# Patient Record
Sex: MALE | Race: OTHER | Employment: OTHER | URBAN - METROPOLITAN AREA
[De-identification: names, ages, dates, MRNs, and addresses within clinical notes are randomized per-mention and may not be internally consistent; named-entity substitution may affect disease eponyms.]

---

## 2017-05-17 ENCOUNTER — HOSPITAL ENCOUNTER (OUTPATIENT)
Dept: PREADMISSION TESTING | Age: 77
Discharge: HOME | End: 2017-05-17
Payer: MEDICARE

## 2017-05-17 DIAGNOSIS — Z01.818 PRE-OP TESTING: ICD-10-CM

## 2017-05-17 DIAGNOSIS — M19.011 ARTHRITIS OF RIGHT SHOULDER REGION: ICD-10-CM

## 2017-05-17 LAB
ANION GAP BLD CALC-SCNC: 6 MMOL/L (ref 3–18)
BUN SERPL-MCNC: 26 MG/DL (ref 7–18)
BUN/CREAT SERPL: 18 (ref 12–20)
CALCIUM SERPL-MCNC: 8.4 MG/DL (ref 8.5–10.1)
CHLORIDE SERPL-SCNC: 109 MMOL/L (ref 100–108)
CO2 SERPL-SCNC: 25 MMOL/L (ref 21–32)
CREAT SERPL-MCNC: 1.47 MG/DL (ref 0.6–1.3)
ERYTHROCYTE [DISTWIDTH] IN BLOOD BY AUTOMATED COUNT: 13.4 % (ref 11.6–14.5)
GLUCOSE SERPL-MCNC: 106 MG/DL (ref 74–99)
HCT VFR BLD AUTO: 37.7 % (ref 36–48)
HGB BLD-MCNC: 12.5 G/DL (ref 13–16)
MCH RBC QN AUTO: 32.1 PG (ref 24–34)
MCHC RBC AUTO-ENTMCNC: 33.2 G/DL (ref 31–37)
MCV RBC AUTO: 96.9 FL (ref 74–97)
PLATELET # BLD AUTO: 200 K/UL (ref 135–420)
PMV BLD AUTO: 10.8 FL (ref 9.2–11.8)
POTASSIUM SERPL-SCNC: 4.7 MMOL/L (ref 3.5–5.5)
RBC # BLD AUTO: 3.89 M/UL (ref 4.7–5.5)
SODIUM SERPL-SCNC: 140 MMOL/L (ref 136–145)
WBC # BLD AUTO: 5.1 K/UL (ref 4.6–13.2)

## 2017-05-17 PROCEDURE — 36415 COLL VENOUS BLD VENIPUNCTURE: CPT | Performed by: ORTHOPAEDIC SURGERY

## 2017-05-17 PROCEDURE — 85027 COMPLETE CBC AUTOMATED: CPT | Performed by: ORTHOPAEDIC SURGERY

## 2017-05-17 PROCEDURE — 80048 BASIC METABOLIC PNL TOTAL CA: CPT | Performed by: ORTHOPAEDIC SURGERY

## 2017-05-17 RX ORDER — GUAIFENESIN 100 MG/5ML
81 LIQUID (ML) ORAL DAILY
COMMUNITY

## 2017-05-17 RX ORDER — IBUPROFEN 200 MG
400 TABLET ORAL
COMMUNITY

## 2017-05-17 RX ORDER — TERAZOSIN 2 MG/1
2 CAPSULE ORAL
COMMUNITY

## 2017-05-17 RX ORDER — ATORVASTATIN CALCIUM 40 MG/1
40 TABLET, FILM COATED ORAL DAILY
COMMUNITY

## 2017-05-17 RX ORDER — ASPIRIN 325 MG
1 TABLET, DELAYED RELEASE (ENTERIC COATED) ORAL
COMMUNITY

## 2017-05-17 RX ORDER — LISINOPRIL 5 MG/1
2.5 TABLET ORAL DAILY
COMMUNITY

## 2017-05-17 RX ORDER — LEVOTHYROXINE SODIUM 75 UG/1
TABLET ORAL
COMMUNITY
End: 2020-08-13 | Stop reason: DRUGHIGH

## 2017-06-01 ENCOUNTER — ANESTHESIA EVENT (OUTPATIENT)
Dept: SURGERY | Age: 77
DRG: 483 | End: 2017-06-01
Payer: MEDICARE

## 2017-06-02 ENCOUNTER — APPOINTMENT (OUTPATIENT)
Dept: GENERAL RADIOLOGY | Age: 77
DRG: 483 | End: 2017-06-02
Attending: ORTHOPAEDIC SURGERY
Payer: MEDICARE

## 2017-06-02 ENCOUNTER — ANESTHESIA (OUTPATIENT)
Dept: SURGERY | Age: 77
DRG: 483 | End: 2017-06-02
Payer: MEDICARE

## 2017-06-02 ENCOUNTER — HOSPITAL ENCOUNTER (INPATIENT)
Age: 77
LOS: 1 days | Discharge: HOME | DRG: 483 | End: 2017-06-03
Attending: ORTHOPAEDIC SURGERY | Admitting: ORTHOPAEDIC SURGERY
Payer: MEDICARE

## 2017-06-02 PROBLEM — M19.011 OSTEOARTHRITIS OF RIGHT SHOULDER REGION: Status: ACTIVE | Noted: 2017-06-02

## 2017-06-02 LAB
GLUCOSE BLD STRIP.AUTO-MCNC: 124 MG/DL (ref 70–110)
HCT VFR BLD AUTO: 34 % (ref 36–48)
HGB BLD-MCNC: 11.2 G/DL (ref 13–16)

## 2017-06-02 PROCEDURE — 77030020788: Performed by: ORTHOPAEDIC SURGERY

## 2017-06-02 PROCEDURE — 74011250637 HC RX REV CODE- 250/637: Performed by: ORTHOPAEDIC SURGERY

## 2017-06-02 PROCEDURE — 77030006822 HC BLD SAW SAG BRSM -B: Performed by: ORTHOPAEDIC SURGERY

## 2017-06-02 PROCEDURE — 77030008477 HC STYL SATN SLP COVD -A: Performed by: ANESTHESIOLOGY

## 2017-06-02 PROCEDURE — 77030018846 HC SOL IRR STRL H20 ICUM -A: Performed by: ORTHOPAEDIC SURGERY

## 2017-06-02 PROCEDURE — 77030011640 HC PAD GRND REM COVD -A: Performed by: ORTHOPAEDIC SURGERY

## 2017-06-02 PROCEDURE — 77030008683 HC TU ET CUF COVD -A: Performed by: ANESTHESIOLOGY

## 2017-06-02 PROCEDURE — 97162 PT EVAL MOD COMPLEX 30 MIN: CPT

## 2017-06-02 PROCEDURE — 0RRJ00Z REPLACEMENT OF RIGHT SHOULDER JOINT WITH REVERSE BALL AND SOCKET SYNTHETIC SUBSTITUTE, OPEN APPROACH: ICD-10-PCS | Performed by: ORTHOPAEDIC SURGERY

## 2017-06-02 PROCEDURE — 74011250636 HC RX REV CODE- 250/636: Performed by: ORTHOPAEDIC SURGERY

## 2017-06-02 PROCEDURE — 88305 TISSUE EXAM BY PATHOLOGIST: CPT | Performed by: ORTHOPAEDIC SURGERY

## 2017-06-02 PROCEDURE — A4565 SLINGS: HCPCS | Performed by: ORTHOPAEDIC SURGERY

## 2017-06-02 PROCEDURE — 74011000250 HC RX REV CODE- 250

## 2017-06-02 PROCEDURE — 74011250636 HC RX REV CODE- 250/636: Performed by: NURSE ANESTHETIST, CERTIFIED REGISTERED

## 2017-06-02 PROCEDURE — 85018 HEMOGLOBIN: CPT | Performed by: ORTHOPAEDIC SURGERY

## 2017-06-02 PROCEDURE — 77030002933 HC SUT MCRYL J&J -A: Performed by: ORTHOPAEDIC SURGERY

## 2017-06-02 PROCEDURE — 97530 THERAPEUTIC ACTIVITIES: CPT

## 2017-06-02 PROCEDURE — 82962 GLUCOSE BLOOD TEST: CPT

## 2017-06-02 PROCEDURE — 36415 COLL VENOUS BLD VENIPUNCTURE: CPT | Performed by: ORTHOPAEDIC SURGERY

## 2017-06-02 PROCEDURE — 74011250637 HC RX REV CODE- 250/637: Performed by: NURSE ANESTHETIST, CERTIFIED REGISTERED

## 2017-06-02 PROCEDURE — 74011250636 HC RX REV CODE- 250/636

## 2017-06-02 PROCEDURE — 77030013079 HC BLNKT BAIR HGGR 3M -A: Performed by: ANESTHESIOLOGY

## 2017-06-02 PROCEDURE — 88311 DECALCIFY TISSUE: CPT | Performed by: ORTHOPAEDIC SURGERY

## 2017-06-02 PROCEDURE — 76060000034 HC ANESTHESIA 1.5 TO 2 HR: Performed by: ORTHOPAEDIC SURGERY

## 2017-06-02 PROCEDURE — 65270000029 HC RM PRIVATE

## 2017-06-02 PROCEDURE — 73030 X-RAY EXAM OF SHOULDER: CPT

## 2017-06-02 PROCEDURE — 76010000153 HC OR TIME 1.5 TO 2 HR: Performed by: ORTHOPAEDIC SURGERY

## 2017-06-02 PROCEDURE — 77030031139 HC SUT VCRL2 J&J -A: Performed by: ORTHOPAEDIC SURGERY

## 2017-06-02 PROCEDURE — 74011250637 HC RX REV CODE- 250/637: Performed by: NURSE PRACTITIONER

## 2017-06-02 PROCEDURE — 74011000250 HC RX REV CODE- 250: Performed by: ORTHOPAEDIC SURGERY

## 2017-06-02 PROCEDURE — C1776 JOINT DEVICE (IMPLANTABLE): HCPCS | Performed by: ORTHOPAEDIC SURGERY

## 2017-06-02 PROCEDURE — L3650 SO 8 ABD RESTRAINT PRE OTS: HCPCS | Performed by: ORTHOPAEDIC SURGERY

## 2017-06-02 PROCEDURE — 88304 TISSUE EXAM BY PATHOLOGIST: CPT | Performed by: ORTHOPAEDIC SURGERY

## 2017-06-02 PROCEDURE — 76210000006 HC OR PH I REC 0.5 TO 1 HR: Performed by: ORTHOPAEDIC SURGERY

## 2017-06-02 PROCEDURE — 77030034075 HC SYR ASPIR ACDA INCL EXAC -G: Performed by: ORTHOPAEDIC SURGERY

## 2017-06-02 PROCEDURE — 77030034479 HC ADH SKN CLSR PRINEO J&J -B: Performed by: ORTHOPAEDIC SURGERY

## 2017-06-02 PROCEDURE — 77030032490 HC SLV COMPR SCD KNE COVD -B: Performed by: ORTHOPAEDIC SURGERY

## 2017-06-02 PROCEDURE — 77030003029 HC SUT VCRL J&J -B: Performed by: ORTHOPAEDIC SURGERY

## 2017-06-02 PROCEDURE — 77030018836 HC SOL IRR NACL ICUM -A: Performed by: ORTHOPAEDIC SURGERY

## 2017-06-02 PROCEDURE — 77030034444: Performed by: ORTHOPAEDIC SURGERY

## 2017-06-02 PROCEDURE — 77030011265 HC ELECTRD BLD HEX COVD -A: Performed by: ORTHOPAEDIC SURGERY

## 2017-06-02 PROCEDURE — 77030003806 HC BIT DRL EXAC -E: Performed by: ORTHOPAEDIC SURGERY

## 2017-06-02 DEVICE — TRAY HUM ADPT REV +0 -- EQUINOXE: Type: IMPLANTABLE DEVICE | Site: SHOULDER | Status: FUNCTIONAL

## 2017-06-02 DEVICE — KIT BNE SCR L34MM DIA4.5MM GLEN SHLDR RED COMPR LOK CAP REV: Type: IMPLANTABLE DEVICE | Site: SHOULDER | Status: FUNCTIONAL

## 2017-06-02 DEVICE — SCR BNE LCK GLENOSPHERE -- EQUINOXE: Type: IMPLANTABLE DEVICE | Site: SHOULDER | Status: FUNCTIONAL

## 2017-06-02 DEVICE — STEM HUM DIA13MM SHLDR PRI PRESSFIT EQUINOXE: Type: IMPLANTABLE DEVICE | Site: SHOULDER | Status: FUNCTIONAL

## 2017-06-02 DEVICE — SHOULDER REV IMPL -- S4 BSV SC: Type: IMPLANTABLE DEVICE | Site: SHOULDER | Status: FUNCTIONAL

## 2017-06-02 DEVICE — SPHERE GLEN DIA42MM REG STD SHLDR CO CHROM LCK SCR PRI RVS: Type: IMPLANTABLE DEVICE | Site: SHOULDER | Status: FUNCTIONAL

## 2017-06-02 DEVICE — IMPLANTABLE DEVICE: Type: IMPLANTABLE DEVICE | Site: SHOULDER | Status: FUNCTIONAL

## 2017-06-02 DEVICE — KIT BNE SCR L22MM DIA4.5MM GLEN SHLDR BLK COMPR LOK CAP REV: Type: IMPLANTABLE DEVICE | Site: SHOULDER | Status: FUNCTIONAL

## 2017-06-02 DEVICE — KIT BONE SCR L38MM DIA4.5MM GLEN SHLDR GRN COMPR LCK CAP RVS: Type: IMPLANTABLE DEVICE | Site: SHOULDER | Status: FUNCTIONAL

## 2017-06-02 DEVICE — SCR TORQUE DEFINING KIT -- EQUINOXE: Type: IMPLANTABLE DEVICE | Site: SHOULDER | Status: FUNCTIONAL

## 2017-06-02 RX ORDER — SODIUM CHLORIDE, SODIUM LACTATE, POTASSIUM CHLORIDE, CALCIUM CHLORIDE 600; 310; 30; 20 MG/100ML; MG/100ML; MG/100ML; MG/100ML
75 INJECTION, SOLUTION INTRAVENOUS CONTINUOUS
Status: DISCONTINUED | OUTPATIENT
Start: 2017-06-02 | End: 2017-06-02 | Stop reason: HOSPADM

## 2017-06-02 RX ORDER — HYDROCODONE BITARTRATE AND ACETAMINOPHEN 5; 325 MG/1; MG/1
1 TABLET ORAL ONCE
Status: COMPLETED | OUTPATIENT
Start: 2017-06-02 | End: 2017-06-02

## 2017-06-02 RX ORDER — SODIUM CHLORIDE 0.9 % (FLUSH) 0.9 %
5-10 SYRINGE (ML) INJECTION EVERY 8 HOURS
Status: DISCONTINUED | OUTPATIENT
Start: 2017-06-02 | End: 2017-06-03 | Stop reason: HOSPADM

## 2017-06-02 RX ORDER — PROPOFOL 10 MG/ML
INJECTION, EMULSION INTRAVENOUS AS NEEDED
Status: DISCONTINUED | OUTPATIENT
Start: 2017-06-02 | End: 2017-06-02 | Stop reason: HOSPADM

## 2017-06-02 RX ORDER — DIPHENHYDRAMINE HCL 25 MG
25 CAPSULE ORAL
Status: DISCONTINUED | OUTPATIENT
Start: 2017-06-02 | End: 2017-06-03 | Stop reason: HOSPADM

## 2017-06-02 RX ORDER — CEFAZOLIN SODIUM 2 G/50ML
2 SOLUTION INTRAVENOUS EVERY 8 HOURS
Status: COMPLETED | OUTPATIENT
Start: 2017-06-02 | End: 2017-06-03

## 2017-06-02 RX ORDER — NALOXONE HYDROCHLORIDE 0.4 MG/ML
0.4 INJECTION, SOLUTION INTRAMUSCULAR; INTRAVENOUS; SUBCUTANEOUS AS NEEDED
Status: DISCONTINUED | OUTPATIENT
Start: 2017-06-02 | End: 2017-06-03 | Stop reason: HOSPADM

## 2017-06-02 RX ORDER — ONDANSETRON 8 MG/1
4 TABLET, ORALLY DISINTEGRATING ORAL
Status: DISCONTINUED | OUTPATIENT
Start: 2017-06-02 | End: 2017-06-03 | Stop reason: HOSPADM

## 2017-06-02 RX ORDER — FENTANYL CITRATE 50 UG/ML
INJECTION, SOLUTION INTRAMUSCULAR; INTRAVENOUS AS NEEDED
Status: DISCONTINUED | OUTPATIENT
Start: 2017-06-02 | End: 2017-06-02 | Stop reason: HOSPADM

## 2017-06-02 RX ORDER — LIDOCAINE HYDROCHLORIDE 10 MG/ML
0.1 INJECTION, SOLUTION EPIDURAL; INFILTRATION; INTRACAUDAL; PERINEURAL AS NEEDED
Status: DISCONTINUED | OUTPATIENT
Start: 2017-06-02 | End: 2017-06-02 | Stop reason: HOSPADM

## 2017-06-02 RX ORDER — OXYCODONE AND ACETAMINOPHEN 5; 325 MG/1; MG/1
1-2 TABLET ORAL
Qty: 50 TAB | Refills: 0 | Status: SHIPPED | OUTPATIENT
Start: 2017-06-02 | End: 2020-02-21

## 2017-06-02 RX ORDER — ONDANSETRON 2 MG/ML
INJECTION INTRAMUSCULAR; INTRAVENOUS AS NEEDED
Status: DISCONTINUED | OUTPATIENT
Start: 2017-06-02 | End: 2017-06-02 | Stop reason: HOSPADM

## 2017-06-02 RX ORDER — HYDROMORPHONE HYDROCHLORIDE 1 MG/ML
0.5 INJECTION, SOLUTION INTRAMUSCULAR; INTRAVENOUS; SUBCUTANEOUS
Status: DISPENSED | OUTPATIENT
Start: 2017-06-02 | End: 2017-06-03

## 2017-06-02 RX ORDER — OXYCODONE AND ACETAMINOPHEN 5; 325 MG/1; MG/1
2 TABLET ORAL
Status: DISCONTINUED | OUTPATIENT
Start: 2017-06-02 | End: 2017-06-03 | Stop reason: HOSPADM

## 2017-06-02 RX ORDER — DIPHENHYDRAMINE HYDROCHLORIDE 50 MG/ML
25 INJECTION, SOLUTION INTRAMUSCULAR; INTRAVENOUS
Status: DISCONTINUED | OUTPATIENT
Start: 2017-06-02 | End: 2017-06-02 | Stop reason: HOSPADM

## 2017-06-02 RX ORDER — TERAZOSIN 2 MG/1
2 CAPSULE ORAL
Status: DISCONTINUED | OUTPATIENT
Start: 2017-06-02 | End: 2017-06-03 | Stop reason: HOSPADM

## 2017-06-02 RX ORDER — ROCURONIUM BROMIDE 10 MG/ML
INJECTION, SOLUTION INTRAVENOUS AS NEEDED
Status: DISCONTINUED | OUTPATIENT
Start: 2017-06-02 | End: 2017-06-02 | Stop reason: HOSPADM

## 2017-06-02 RX ORDER — ONDANSETRON 4 MG/1
4 TABLET, ORALLY DISINTEGRATING ORAL
Qty: 20 TAB | Refills: 0 | Status: SHIPPED | OUTPATIENT
Start: 2017-06-02 | End: 2020-02-21

## 2017-06-02 RX ORDER — HYDROMORPHONE HYDROCHLORIDE 2 MG/ML
0.5 INJECTION, SOLUTION INTRAMUSCULAR; INTRAVENOUS; SUBCUTANEOUS
Status: DISCONTINUED | OUTPATIENT
Start: 2017-06-02 | End: 2017-06-02

## 2017-06-02 RX ORDER — DIPHENHYDRAMINE HYDROCHLORIDE 50 MG/ML
25 INJECTION, SOLUTION INTRAMUSCULAR; INTRAVENOUS
Status: DISCONTINUED | OUTPATIENT
Start: 2017-06-02 | End: 2017-06-02

## 2017-06-02 RX ORDER — LISINOPRIL 5 MG/1
5 TABLET ORAL DAILY
Status: DISCONTINUED | OUTPATIENT
Start: 2017-06-03 | End: 2017-06-03 | Stop reason: HOSPADM

## 2017-06-02 RX ORDER — FENTANYL CITRATE 50 UG/ML
50 INJECTION, SOLUTION INTRAMUSCULAR; INTRAVENOUS
Status: DISCONTINUED | OUTPATIENT
Start: 2017-06-02 | End: 2017-06-02 | Stop reason: HOSPADM

## 2017-06-02 RX ORDER — ATORVASTATIN CALCIUM 40 MG/1
40 TABLET, FILM COATED ORAL DAILY
Status: DISCONTINUED | OUTPATIENT
Start: 2017-06-03 | End: 2017-06-03 | Stop reason: HOSPADM

## 2017-06-02 RX ORDER — FENTANYL CITRATE 50 UG/ML
50 INJECTION, SOLUTION INTRAMUSCULAR; INTRAVENOUS AS NEEDED
Status: DISCONTINUED | OUTPATIENT
Start: 2017-06-02 | End: 2017-06-02

## 2017-06-02 RX ORDER — GLYCOPYRROLATE 0.2 MG/ML
INJECTION INTRAMUSCULAR; INTRAVENOUS AS NEEDED
Status: DISCONTINUED | OUTPATIENT
Start: 2017-06-02 | End: 2017-06-02 | Stop reason: HOSPADM

## 2017-06-02 RX ORDER — ASPIRIN 325 MG
325 TABLET, DELAYED RELEASE (ENTERIC COATED) ORAL 2 TIMES DAILY
Status: DISCONTINUED | OUTPATIENT
Start: 2017-06-02 | End: 2017-06-03 | Stop reason: HOSPADM

## 2017-06-02 RX ORDER — CEFAZOLIN SODIUM 2 G/50ML
2 SOLUTION INTRAVENOUS
Status: COMPLETED | OUTPATIENT
Start: 2017-06-02 | End: 2017-06-02

## 2017-06-02 RX ORDER — HYDROMORPHONE HYDROCHLORIDE 2 MG/ML
0.5 INJECTION, SOLUTION INTRAMUSCULAR; INTRAVENOUS; SUBCUTANEOUS
Status: DISCONTINUED | OUTPATIENT
Start: 2017-06-02 | End: 2017-06-02 | Stop reason: HOSPADM

## 2017-06-02 RX ORDER — DOCUSATE SODIUM 100 MG/1
100 CAPSULE, LIQUID FILLED ORAL 2 TIMES DAILY
Status: DISCONTINUED | OUTPATIENT
Start: 2017-06-02 | End: 2017-06-03 | Stop reason: HOSPADM

## 2017-06-02 RX ORDER — OXYCODONE AND ACETAMINOPHEN 5; 325 MG/1; MG/1
1 TABLET ORAL
Status: DISCONTINUED | OUTPATIENT
Start: 2017-06-02 | End: 2017-06-03 | Stop reason: HOSPADM

## 2017-06-02 RX ORDER — VASOPRESSIN 20 U/ML
INJECTION PARENTERAL AS NEEDED
Status: DISCONTINUED | OUTPATIENT
Start: 2017-06-02 | End: 2017-06-02 | Stop reason: HOSPADM

## 2017-06-02 RX ORDER — SODIUM CHLORIDE 0.9 % (FLUSH) 0.9 %
5-10 SYRINGE (ML) INJECTION AS NEEDED
Status: DISCONTINUED | OUTPATIENT
Start: 2017-06-02 | End: 2017-06-03 | Stop reason: HOSPADM

## 2017-06-02 RX ORDER — LIDOCAINE HYDROCHLORIDE 20 MG/ML
INJECTION, SOLUTION EPIDURAL; INFILTRATION; INTRACAUDAL; PERINEURAL AS NEEDED
Status: DISCONTINUED | OUTPATIENT
Start: 2017-06-02 | End: 2017-06-02 | Stop reason: HOSPADM

## 2017-06-02 RX ORDER — NEOSTIGMINE METHYLSULFATE 5 MG/5 ML
SYRINGE (ML) INTRAVENOUS AS NEEDED
Status: DISCONTINUED | OUTPATIENT
Start: 2017-06-02 | End: 2017-06-02 | Stop reason: HOSPADM

## 2017-06-02 RX ORDER — BUPIVACAINE HYDROCHLORIDE AND EPINEPHRINE 2.5; 5 MG/ML; UG/ML
INJECTION, SOLUTION EPIDURAL; INFILTRATION; INTRACAUDAL; PERINEURAL AS NEEDED
Status: DISCONTINUED | OUTPATIENT
Start: 2017-06-02 | End: 2017-06-02 | Stop reason: HOSPADM

## 2017-06-02 RX ORDER — LEVOTHYROXINE SODIUM 75 UG/1
75 TABLET ORAL
Status: DISCONTINUED | OUTPATIENT
Start: 2017-06-03 | End: 2017-06-03 | Stop reason: HOSPADM

## 2017-06-02 RX ORDER — DIPHENHYDRAMINE HYDROCHLORIDE 50 MG/ML
25 INJECTION, SOLUTION INTRAMUSCULAR; INTRAVENOUS
Status: DISCONTINUED | OUTPATIENT
Start: 2017-06-02 | End: 2017-06-03 | Stop reason: HOSPADM

## 2017-06-02 RX ADMIN — Medication 10 ML: at 21:57

## 2017-06-02 RX ADMIN — DOCUSATE SODIUM 100 MG: 100 CAPSULE, LIQUID FILLED ORAL at 17:35

## 2017-06-02 RX ADMIN — ONDANSETRON 4 MG: 2 INJECTION INTRAMUSCULAR; INTRAVENOUS at 09:04

## 2017-06-02 RX ADMIN — GLYCOPYRROLATE 0.4 MG: 0.2 INJECTION INTRAMUSCULAR; INTRAVENOUS at 09:04

## 2017-06-02 RX ADMIN — SODIUM CHLORIDE, SODIUM LACTATE, POTASSIUM CHLORIDE, AND CALCIUM CHLORIDE: 600; 310; 30; 20 INJECTION, SOLUTION INTRAVENOUS at 09:22

## 2017-06-02 RX ADMIN — ROCURONIUM BROMIDE 50 MG: 10 INJECTION, SOLUTION INTRAVENOUS at 07:45

## 2017-06-02 RX ADMIN — OXYCODONE HYDROCHLORIDE AND ACETAMINOPHEN 2 TABLET: 5; 325 TABLET ORAL at 16:31

## 2017-06-02 RX ADMIN — CEFAZOLIN SODIUM 2 G: 2 SOLUTION INTRAVENOUS at 14:00

## 2017-06-02 RX ADMIN — FENTANYL CITRATE 100 MCG: 50 INJECTION, SOLUTION INTRAMUSCULAR; INTRAVENOUS at 07:40

## 2017-06-02 RX ADMIN — VASOPRESSIN 2 UNITS: 20 INJECTION PARENTERAL at 08:11

## 2017-06-02 RX ADMIN — VASOPRESSIN 1 UNITS: 20 INJECTION PARENTERAL at 08:51

## 2017-06-02 RX ADMIN — DOCUSATE SODIUM 100 MG: 100 CAPSULE, LIQUID FILLED ORAL at 12:47

## 2017-06-02 RX ADMIN — ASPIRIN 325 MG: 325 TABLET, DELAYED RELEASE ORAL at 17:35

## 2017-06-02 RX ADMIN — LIDOCAINE HYDROCHLORIDE 100 MG: 20 INJECTION, SOLUTION EPIDURAL; INFILTRATION; INTRACAUDAL; PERINEURAL at 07:45

## 2017-06-02 RX ADMIN — HYDROMORPHONE HYDROCHLORIDE 0.5 MG: 1 INJECTION, SOLUTION INTRAMUSCULAR; INTRAVENOUS; SUBCUTANEOUS at 11:46

## 2017-06-02 RX ADMIN — Medication 3 MG: at 09:04

## 2017-06-02 RX ADMIN — FENTANYL CITRATE 50 MCG: 50 INJECTION, SOLUTION INTRAMUSCULAR; INTRAVENOUS at 09:11

## 2017-06-02 RX ADMIN — OXYCODONE HYDROCHLORIDE AND ACETAMINOPHEN 2 TABLET: 5; 325 TABLET ORAL at 12:47

## 2017-06-02 RX ADMIN — CEFAZOLIN SODIUM 2 G: 2 SOLUTION INTRAVENOUS at 08:04

## 2017-06-02 RX ADMIN — PROPOFOL 150 MG: 10 INJECTION, EMULSION INTRAVENOUS at 07:45

## 2017-06-02 RX ADMIN — SODIUM CHLORIDE, SODIUM LACTATE, POTASSIUM CHLORIDE, AND CALCIUM CHLORIDE 75 ML/HR: 600; 310; 30; 20 INJECTION, SOLUTION INTRAVENOUS at 07:33

## 2017-06-02 RX ADMIN — FENTANYL CITRATE 50 MCG: 50 INJECTION, SOLUTION INTRAMUSCULAR; INTRAVENOUS at 09:31

## 2017-06-02 RX ADMIN — OXYCODONE HYDROCHLORIDE AND ACETAMINOPHEN 1 TABLET: 5; 325 TABLET ORAL at 22:23

## 2017-06-02 RX ADMIN — Medication 10 ML: at 17:36

## 2017-06-02 RX ADMIN — VASOPRESSIN 1 UNITS: 20 INJECTION PARENTERAL at 09:09

## 2017-06-02 RX ADMIN — TERAZOSIN HYDROCHLORIDE 2 MG: 2 CAPSULE ORAL at 21:56

## 2017-06-02 RX ADMIN — ASPIRIN 325 MG: 325 TABLET, DELAYED RELEASE ORAL at 12:47

## 2017-06-02 RX ADMIN — CEFAZOLIN SODIUM 2 G: 2 SOLUTION INTRAVENOUS at 21:56

## 2017-06-02 RX ADMIN — HYDROCODONE BITARTRATE AND ACETAMINOPHEN 1 TABLET: 5; 325 TABLET ORAL at 10:20

## 2017-06-02 NOTE — OP NOTES
Armando Gomez    Name:  Briseida Antonio  MR#:  [id number]  :  1940  Account #:  [de-identified]  Date of Adm:  2017  Date of Surgery:  2017      ATTENDING SURGEON: Antonio Claire MD    ASSISTANT: Staff. PREOPERATIVE DIAGNOSIS: Right shoulder rotator cuff arthropathy. POSTOPERATIVE DIAGNOSIS: Right shoulder rotator cuff  arthropathy. PROCEDURES PERFORMED: Right reverse total shoulder  arthroplasty. SPECIMENS REMOVED: None. ESTIMATED BLOOD LOSS: 125 mL. HARDWARE USED: Equinoxe 13 mm primary press-fit stem, 42 mm  glenosphere, +0 mm humeral adapter tray, a 42 mm +2.5 mm humeral  liner. ANESTHESIA: General.    INDICATIONS: The patient is a 21-year-old gentleman with  longstanding right shoulder pain who presents for right reverse total  shoulder arthroplasty. Risks of surgery including infection, bleeding,  damage to nerves and vessels. No improvement of his symptoms,  need of revision operation was discussed. He desires to proceed. DESCRIPTION OF PROCEDURE: After informed consent, the patient  was taken to the operating room, placed on the operating table. After  adequate sedation, he was intubated. He was positioned in beach  chair position with all bony prominences well-padded. Right upper  extremity was prepped and draped in sterile fashion using ChloraPrep. Appropriate timeout was taken. Deltopectoral approach to the shoulder  was performed, carried down through skin and subcutaneous tissues  down to the level of the deltopectoral interval, care protecting the  cephalic vein and retracting it laterally with the deltoid. The underlying  clavipectoral fascia was incised along the lateral border of the  coracobrachialis, exposing the proximal shoulder. The superior fibers  of the subscapularis were ruptured. There were several inferior fibers  remaining. A 0 Vicryl was placed into it and it was released and tagged  for possible later repair. An inferior capsular release was performed. The entire supraspinatus and infraspinatus tendons were ruptured. There was found to be severe humeral chondral loss. A humeral head  cut was made along its anatomic axis in 20 degrees of retroversion. Once this was performed, the humeral shaft was then reamed to a size  13 mm, broached to a 13 mm and a 13 mm press-fit stem was placed. Exposure of the glenoid was then performed by placing anterior and  posterior glenoid retractors. All paralabral tissue was removed, fully  exposing the glenoid. A glenoid guide was then applied to the surface  of the glenoid and a guide pin was placed into the center position of  the glenoid, exiting at Matsen's point. Once this was confirmed, the  glenoid was reamed to a size 42 and a central drill was used to drill the  hole for the glenoid peg. The glenoid baseplate was inserted and  secured with 3 peripheral locking screws, all inferior to the border of  the peg. The locking caps were placed as well. A 42 mm glenosphere  was then inserted and secured with a locking screw. Attention was turned back to the humerus. The humerus was trialed  with a 42 mm +2.5 and a +0 mm humeral liner. It was felt to have  better tissue tension and stability with a 2.5 mm. Trial components  were removed. The final components were inserted after breaking off  the torque defining screw. He was able to get his hand to the  contralateral shoulder fully over his head and had excellent overall  stability. The shoulder was then copiously irrigated with Betadine and  saline solution. Hemostasis was obtained with electrocautery. Platelet-  poor plasma was used as well to help with hemostasis. Platelet-rich  plasma was also used to help with augment wound healing. Then, 0  Vicryl for the deltopectoral interval, 2-0 Vicryl for the subcutaneous  tissues, 4-0 Monocryl subcuticular closure, and Prineo was used for  the skin.  He was placed in a sling and returned to the PACU in stable  condition. There were no complications.         Leonard Merrill MD CM / Caecilia.Crigler  D:  06/02/2017   09:35  T:  06/02/2017   10:18  Job #:  [id number]

## 2017-06-02 NOTE — ANESTHESIA POSTPROCEDURE EVALUATION
Post-Anesthesia Evaluation and Assessment    Patient: Vikram Hong. MRN: [id number]  SSN: [id number]    YOB: 1940  Age: 77 y.o. Sex: male       Cardiovascular Function/Vital Signs  Visit Vitals    /61    Pulse (!) 50    Temp 36.5 °C (97.7 °F)    Resp 12    Ht 5' 7\" (1.702 m)    Wt 94.6 kg (208 lb 9 oz)    SpO2 97%    BMI 32.67 kg/m2       Patient is status post general anesthesia for Procedure(s):  right release total SHOULDER  ARTHROPLASTY. Nausea/Vomiting: None    Postoperative hydration reviewed and adequate. Pain:  Pain Scale 1: Numeric (0 - 10) (06/02/17 1012)  Pain Intensity 1: 5 (06/02/17 1012)   Managed    Neurological Status:   Neuro (WDL): Within Defined Limits (06/02/17 1012)  Neuro  RUE Motor Response: Purposeful;Spontaneous  (06/02/17 1012)   At baseline    Mental Status and Level of Consciousness: Arousable    Pulmonary Status:   O2 Device: Room air (06/02/17 1012)   Adequate oxygenation and airway patent    Complications related to anesthesia: None    Post-anesthesia assessment completed.  No concerns    Signed By: Gisela Perkins MD     June 2, 2017

## 2017-06-02 NOTE — PERIOP NOTES
TRANSFER - OUT REPORT:    Verbal report given to [facility] [place], RN(name) on Lucero Ashley.  being transferred to 2200(unit) for routine post - op       Report consisted of patients Situation, Background, Assessment and   Recommendations(SBAR). Information from the following report(s) SBAR, Kardex, OR Summary, Intake/Output, MAR and Recent Results was reviewed with the receiving nurse. Lines:   Peripheral IV 06/02/17 Left Hand (Active)   Site Assessment Clean, dry, & intact 6/2/2017 10:12 AM   Phlebitis Assessment 0 6/2/2017 10:12 AM   Infiltration Assessment 0 6/2/2017 10:12 AM   Dressing Status Clean, dry, & intact 6/2/2017  9:35 AM   Dressing Type Transparent;Tape 6/2/2017 10:12 AM   Hub Color/Line Status Pink; Infusing 6/2/2017 10:12 AM   Action Taken Open ports on tubing capped 6/2/2017 10:12 AM   Alcohol Cap Used Yes 6/2/2017 10:12 AM        Opportunity for questions and clarification was provided.       Patient transported with:   [employer] pump

## 2017-06-02 NOTE — PERIOP NOTES
4439  Patient received in PACU and connected to monitors. Vital signs stable. RN at bedside. Will continue to monitor. [address] to pt's wife via phone re update and plan of care. Awaiting room assignment. Pt sitting up in stretcher, eating ice chips. No s/s distress. 1020  Pt medicated per MAR after eating saltines crackers. [address] re post op xray. Notified that \"all techs are in a room at the moment, pt is next on the list\". 65  Pt's wife escorted to PACU to see pt while waiting for xray. 1050  Radiology here to complete xray. Wife escorted back to waiting room, notified of room assignment. Called 2200, RN will call back for report.

## 2017-06-02 NOTE — BRIEF OP NOTE
BRIEF OPERATIVE NOTE    Date of Procedure: 6/2/2017   Preoperative Diagnosis: right shoulder oa m19.011  Postoperative Diagnosis: right shoulder oa m19.011    Procedure(s):  right release total SHOULDER  ARTHROPLASTY  Surgeon(s) and Role:     * Mari Poe MD - Primary         Assistant Staff:       Surgical Staff:  Circ-1: Loyd Tubbs RN  Scrub Tech-1: Rambo Nunes  Surg Asst-1: Krysten Robb  Event Time In   Incision Start 4990   Incision Close      Anesthesia: General   Estimated Blood Loss: 125 cc  Specimens: * No specimens in log *   Findings: see op note   Complications: none  Implants:   Implant Name Type Inv. Item Serial No.  Lot No. LRB No. Used Action   SCR LCK CAP KIT 4.5X38MM RED -- E[employer] - [id number]  SCR LCK CAP KIT 4.5X38MM RED -- Edd Josh [id number] [employer]  Right 1 Implanted   SCR LCK CAP KIT 4.5X34MM RED -- E[employer]INOXE - [id number]  SCR LCK CAP KIT 4.5X34MM RED -- [place] Josh [id number] [employer]  Right 1 Implanted   PLATE MATT HUM ADPT REV +5MM -- Edd Moreno - [id number]  PLATE MATT HUM ADPT REV +5MM -- Edd Moreno [id number] [employer]  Right 1 Implanted   SCR TORQUE DEFINING KIT -- E[employer]INOXE - [id number]  SCR TORQUE DEFINING KIT -- [place] Moreno [id number] [employer]  Right 1 Implanted   SCR LCK CAP KIT 4.5X22MM BLK -- EQUINOXE - [id number]  SCR LCK CAP KIT 4.5X22MM BLK -- E[employer]E [id number] [employer]  Right 1 Implanted   HEAD GLENOSPHERE REV 42MM -- E[employer]INOXE - [id number]  HEAD GLENOSPHERE REV 42MM -- [employer] [id number] [employer]  Right 1 Implanted   TRAY HUM ADPT REV +0 -- E[employer] - [id number]  TRAY HUM ADPT REV +0 -- Edd Moreno [id number] [employer]  Right 1 Implanted   STEM HUM PF PRIMARY 13. 0MM -- Edd Moreno - [id number]  STEM HUM PF PRIMARY 13. 0MM -- Edd Moreno [id number] [employer]  Right 1 Implanted   SCR BNE LCK GLENOSPHERE -- EQUINOXE - [id number]  SCR BNE LCK GLENOSPHERE -- [employer]E [id number] [employer]  Right 1 Implanted   LINER HUM REV 42MM +2.5 -- EQUINOXE - [id number]   LINER HUM REV 42MM +2.5 John Sanchez [id number] [employer]   Right 1 Implanted     Home tomorrow  Jacoby williamson

## 2017-06-02 NOTE — PROGRESS NOTES
Problem: Mobility Impaired (Adult and Pediatric)  Goal: *Acute Goals and Plan of Care (Insert Text)  PHYSICAL THERAPY SHORT TERM GOALS : no weight bearing with right shoulder /UE  1. Patient will perform/completebed mobility with modified independence within 1 week(s). 2. Patient will perform/completesupine to sitting with modified independence within 1 week(s). 3. Patient will perform/completesit to stand with modified independence within 1 week(s). 4. Patient will perform/completeambulate for 150 feet with least restrictive device with modified independence within 1 week(s). 5. Patient will perform /completeascend and descend 2 steps without railing modified independence within 1 week(s). 6. Patient will complete pendulum exercises for right shoulder independently 3 times per day. Therapist Triston Arizmendi, PT 6/2/2017  Time Calculation: 20 mins  Outcome: Progressing Towards Goal  PHYSICAL THERAPY EVALUATION     Patient: Jennifer Stoner (70 y.o. male)  Date: 6/2/2017  Primary Diagnosis: right shoulder oa m19.011  Osteoarthritis of right shoulder region  Procedure(s) (LRB):  right release total SHOULDER  ARTHROPLASTY (Right) Day of Surgery   Precautions:   Fall (no wieght on and no arom of right shoulder )      PROBLEM LIST:  Patient presents with the following problems:   Bed Mobility, Transfers, Range of Motion, Balance and Home Exercise Program  ASSESSMENT :   Patient requires between minimal assistance/contact guard assist and supervision/set-up for bed mobility, transfers and ambulation. patient instructed in pendulum exercise for right  Shoulder needing reinforcement, safety with  Gait and proper  Alignment of sling on right UE pain pre and post 5/10 pre and post  Patient also stood for 3 minutes to urinate needing wife to assist with holding urinal informed nursing. Patient will benefit from skilled intervention to address the above impairments.   Patients rehabilitation potential is considered to be Good  Factors which may influence rehabilitation potential include:   [ ]         None noted  [ ]         Mental ability/status  [X]         Medical condition  [ ]         Home/family situation and support systems  [ ]         Safety awareness  [ ]         Pain tolerance/management  [ ]         Other:        PLAN :  Recommendations and Planned Interventions:  [X]           Bed Mobility Training             [X]    Neuromuscular Re-Education  [X]           Transfer Training                   [ ]    Orthotic/Prosthetic Training  [X]           Gait Training                          [ ]    Modalities  [X]           Therapeutic Exercises          [ ]    Edema Management/Control  [X]           Therapeutic Activities            [X]    Patient and Family Training/Education  [ ]           Other (comment):     Frequency/Duration: Patient will be followed by physical therapy 1-2 times per day/4-7 days per week to address goals. 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 The severity rating is based on the [employer] Balance Scale4/5   [facility] Standing Balance Scale4/5  0: Pt performs 25% or less of standing activity (Max assist) CN, 100% impaired. 1: Pt supports self with upper extremities but requires therapist assistance.  Pt performs 25-50% of effort (Mod assist) CM, 80% to <100% impaired. 1+: Pt supports self with upper extremities but requires therapist assistance. Pt performs >50% effort. (Min assist). CL, 60% to <80% impaired. 2: Pt supports self independently with both upper extremities (walker, crutches, parallel bars). CL, 60% to <80% impaired. 2+: Pt support self independently with 1 upper extremity (cane, crutch, 1 parallel bar). CK, 40% to <60% impaired. 3: Pt stands without upper extremity support for up to 30 seconds. CK, 40% to <60% impaired. 3+: Pt stands without upper extremity support for 30 seconds or greater. CJ, 20% to <40% impaired. 4: Pt independently moves and returns center of gravity 1-2 inches in one plane. CJ, 20% to <40% impaired. 4+: Pt independently moves and returns center of gravity 1-2 inches in multiple planes. CI, 1% to <20% impaired. 5: Pt independently moves and returns center of gravity in all planes greater than 2 inches. CH, 0% impaired.      Eval Complexity: History: HIGH Complexity :3+ comorbidities / personal factors will impact the outcome/ POC Exam:MEDIUM Complexity : 3 Standardized tests and measures addressing body structure, function, activity limitation and / or participation in recreation  Presentation: MEDIUM Complexity : Evolving with changing characteristics  Clinical Decision Making:Medium Complexity [facility] Standing Balance Scale4/5 Overall Complexity:MEDIUM     Prior Level of Function/Home Situation: I with ADL's and ambulation without assistive device   Home Situation  Home Environment: Private residence  # Steps to Enter: 3  Rails to Enter: Yes  Hand Rails : Bilateral  One/Two Story Residence: One story  Living Alone: No  Support Systems: Friends \ neighbors, Family member(s)  Patient Expects to be Discharged to[de-identified] Private residence  Current DME Used/Available at Home: None  Critical Behavior:  Neurologic State: Alert  Orientation Level: Oriented X4;Appropriate for age  Cognition: Follows commands; Appropriate decision making  Safety/Judgement: Awareness of environment; Fall prevention  Psychosocial  Patient Behaviors: Calm; Cooperative  Family  Behaviors: Calm; Appropriate for situation; Cooperative  Purposeful Interaction: Yes  Pt Identified Daily Priority: Clinical issues (comment)  Caritas Process: Nurture loving kindness;Enable jorge/hope;Establish trust;Nurture spiritual self;Teaching/learning; Attend basic human needs;Create healing environment  Caring Interventions: Reassure; Therapeutic modalities  Reassure: Therapeutic listening; Informing; Acceptance  Therapeutic Modalities: Deep breathing;Humor; Intentional therapeutic touch  Skin Condition/Temp: Dry;Warm  Family  Behaviors: Calm; Appropriate for situation; Cooperative  Skin Integrity: Incision (comment) (Rt shoulder)  Skin Integumentary  Skin Color: Appropriate for ethnicity  Skin Condition/Temp: Dry;Warm  Skin Integrity: Incision (comment) (Rt shoulder)  Turgor: Non-tenting  Hair Growth: Present  Varicosities: Absent  Strength:    Strength: Generally decreased, functional (3+/5 both legs )  Not tested right UE  Left 3+/5  Tone & Sensation:   Tone: Normal  Sensation: Intact     Range Of Motion:  AROM: Generally decreased, functional (no active on right Shoulder )  PROM: Generally decreased, functional  Functional Mobility:  Bed Mobility:  Rolling: Contact guard assistance;Stand-by asssistance  Supine to Sit: Stand-by asssistance;Contact guard assistance  Sit to Supine:  (left in chair )   Transfers:  Sit to Stand: Contact guard assistance;Stand-by asssistance  Stand to Sit: Stand-by asssistance;Contact guard assistance  Balance:   Sitting: Impaired  Sitting - Static: Good (unsupported); Fair (occasional)  Sitting - Dynamic: Fair (occasional)  Standing: Impaired  Standing - Static: Fair  Standing - Dynamic : Fair  Ambulation/Gait Training:  Distance (ft): 55 Feet (ft) (x2)  Assistive Device:  (none)  Ambulation - Level of Assistance: Stand-by asssistance;Supervision  Gait Description (WDL): Exceptions to WDL  Gait Abnormalities: Antalgic; Altered arm swing  Base of Support: Center of gravity altered  Speed/Josi: Fluctuations  Interventions: Safety awareness training  Therapeutic Exercises:   Pendulum for right shoulder elbow wrist and finger exercises on right UE   Pain:  Pre treatment pain level:5  Post treatment pain level:5  Pain Scale 1: Numeric (0 - 10)  Activity Tolerance:   Fair   Please refer to the flowsheet for vital signs taken during this treatment. After treatment:   [X]         Patient left in no apparent distress sitting up in chair  [ ]         Patient left in no apparent distress in bed  [X]         Call bell left within reach  [X]         Nursing notified  [X]         Caregiver present  [ ]         Bed alarm activated      COMMUNICATION/EDUCATION:   [X]         Fall prevention education was provided and the patient/caregiver indicated understanding. [X]         Patient/family have participated as able in goal setting and plan of care. [X]         Patient/family agree to work toward stated goals and plan of care. [ ]         Patient understands intent and goals of therapy, but is neutral about his/her participation. [ ]         Patient is unable to participate in goal setting and plan of care. Patient educated on the role of physical therapy during the acute stay  and the importance of mobility. VU.needs reinforcement.         Thank you for this referral.  Asael Jerez, PT   Time Calculation: 20 mins

## 2017-06-02 NOTE — IP AVS SNAPSHOT
Current Discharge Medication List  
  
START taking these medications Dose & Instructions Dispensing Information Comments Morning Noon Evening Bedtime  
 ondansetron 4 mg disintegrating tablet Commonly known as:  ZOFRAN ODT Your last dose was: Your next dose is:    
   
   
 Dose:  4 mg Take 1 Tab by mouth every eight (8) hours as needed for Nausea. Quantity:  20 Tab Refills:  0  
     
   
   
   
  
 oxyCODONE-acetaminophen 5-325 mg per tablet Commonly known as:  PERCOCET Your last dose was: Your next dose is:    
   
   
 Dose:  1-2 Tab Take 1-2 Tabs by mouth every four (4) hours as needed for Pain. Max Daily Amount: 12 Tabs. Quantity:  50 Tab Refills:  0 CONTINUE these medications which have NOT CHANGED Dose & Instructions Dispensing Information Comments Morning Noon Evening Bedtime ADVIL 200 mg tablet Generic drug:  ibuprofen Your last dose was: Your next dose is:    
   
   
 Dose:  400 mg Take 400 mg by mouth. Refills:  0  
     
   
   
   
  
 aspirin 81 mg chewable tablet Your last dose was: Your next dose is:    
   
   
 Dose:  81 mg Take 81 mg by mouth daily. Refills:  0  
     
   
   
   
  
 atorvastatin 40 mg tablet Commonly known as:  LIPITOR Your last dose was: Your next dose is:    
   
   
 Dose:  40 mg Take 40 mg by mouth daily. Refills:  0 Cholecalciferol (Vitamin D3) 50,000 unit Cap Your last dose was: Your next dose is:    
   
   
 Dose:  1 Cap Take 1 Cap by mouth every seven (7) days. Refills:  0  
     
   
   
   
  
 levothyroxine 75 mcg tablet Commonly known as:  SYNTHROID Your last dose was: Your next dose is: Take  by mouth Daily (before breakfast). Refills:  0  
     
   
   
   
  
 lisinopril 5 mg tablet Commonly known as:  [facility] [place] Daughters Your last dose was: Your next dose is:    
   
   
 Dose:  5 mg Take 5 mg by mouth daily. Refills:  0  
     
   
   
   
  
 terazosin 2 mg capsule Commonly known as:  HYTRIN Your last dose was: Your next dose is:    
   
   
 Dose:  2 mg Take 2 mg by mouth nightly. Refills:  0 Where to Get Your Medications Information on where to get these meds will be given to you by the nurse or doctor. ! Ask your nurse or doctor about these medications  
  ondansetron 4 mg disintegrating tablet  
 oxyCODONE-acetaminophen 5-325 mg per tablet

## 2017-06-02 NOTE — DISCHARGE INSTRUCTIONS
David LUDWIG  SHOULDER ARTHROPLASTY POSTOPERATIVE INSTRUCTIONS          1. Apply bag of ice to your shoulder (not directly on the skin) at least 20 minutes each 4 hour interval for the first 2-3 days or until the swelling resolves. Sitting/sleeping in a 30-45 degree reclined position (recliner chair or propped on pillows) often helps with swelling and comfort. While most of the swelling resolves within the first week, it is not uncommon to experince swelling for up to 6 weeks after your surgery. 2. A light dressing may me needed on your wound if there is drainage. If there is no drainage then leave your wound open to air. 3.  You may shower after 48 hiours. Do not let water run constantly over the incisions or submerse them under water in the bath,pool,or hot tub as this may lead to infection. 4.  Wear the sling most of the time. 5.  It is important to begin working on range of motion immediately after surgery. Please follow Dr. Aldo Scott instructions on the exercises  to do. 6.  The first post-operative office visit is apprximately 10 days after your surgery. Call [phone] to schedule this visit after you get home. 7.  At the [address] [place] [address] office visit, the following will be addressed:               A.  Suture removal and determine the need for physical therapy               B.  Review arthroscopy photos               C.  Work release/status and forms        8. Disability/FMLA forms are to be directed to Dr. Aldo Scott . Due to the consuming nature of this paperwork, a fee is charged to have them completed. 9.  Any other questions, concerns, or needs are handled by contacting  Dr. Aldo Scott  [phone] during office hours. If an emergency arises outside of office hours, please contact the office's answering service or your nearest Emergency Room.               1.    Pendulum, Circular       3 times daily in each direction. 2. Passive elevation  Use only your good arm to raise the operative arm  Sets of ten 3 times daily           DISCHARGE SUMMARY from Nurse    The following personal items are in your possession at time of discharge:    Dental Appliances: None  Visual Aid: None     Home Medications: None  Jewelry: None  Clothing: Undergarments, Shirt, Footwear, Jacket/Coat, Pants  Other Valuables: None             PATIENT INSTRUCTIONS:    After general anesthesia or intravenous sedation, for 24 hours or while taking prescription Narcotics:  · Limit your activities  · Do not drive and operate hazardous machinery  · Do not make important personal or business decisions  · Do  not drink alcoholic beverages  · If you have not urinated within 8 hours after discharge, please contact your surgeon on call. Report the following to your surgeon:  · Excessive pain, swelling, redness or odor of or around the surgical area  · Temperature over 100.5  · Nausea and vomiting lasting longer than 4 hours or if unable to take medications  · Any signs of decreased circulation or nerve impairment to extremity: change in color, persistent  numbness, tingling, coldness or increase pain  · Any questions        What to do at Home:  Recommended activity: Activity as tolerated and No driving while on analgesics. If you experience any of the following symptoms redness, Nausea and vomiting lasting 4 hours or more, pain unrelieved by pain medication, numbness and tingling, loss of sensation, please follow up with MD Evelin Marks. *  Please give a list of your current medications to your Primary Care Provider. *  Please update this list whenever your medications are discontinued, doses are      changed, or new medications (including over-the-counter products) are added. *  Please carry medication information at all times in case of emergency situations.           These are general instructions for a healthy lifestyle:    No smoking/ No tobacco products/ Avoid exposure to second hand smoke    Surgeon General's Warning:  Quitting smoking now greatly reduces serious risk to your health. Obesity, smoking, and sedentary lifestyle greatly increases your risk for illness    A healthy diet, regular physical exercise & weight monitoring are important for maintaining a healthy lifestyle    You may be retaining fluid if you have a history of heart failure or if you experience any of the following symptoms:  Weight gain of 3 pounds or more overnight or 5 pounds in a week, increased swelling in our hands or feet or shortness of breath while lying flat in bed. Please call your doctor as soon as you notice any of these symptoms; do not wait until your next office visit. Recognize signs and symptoms of STROKE:    F-face looks uneven    A-arms unable to move or move unevenly    S-speech slurred or non-existent    T-time-call 911 as soon as signs and symptoms begin-DO NOT go       Back to bed or wait to see if you get better-TIME IS BRAIN. Warning Signs of HEART ATTACK     Call 911 if you have these symptoms:   Chest discomfort. Most heart attacks involve discomfort in the center of the chest that lasts more than a few minutes, or that goes away and comes back. It can feel like uncomfortable pressure, squeezing, fullness, or pain.  Discomfort in other areas of the upper body. Symptoms can include pain or discomfort in one or both arms, the back, neck, jaw, or stomach.  Shortness of breath with or without chest discomfort.  Other signs may include breaking out in a cold sweat, nausea, or lightheadedness. Don't wait more than five minutes to call 911 - MINUTES MATTER! Fast action can save your life. Calling 911 is almost always the fastest way to get lifesaving treatment. Emergency Medical Services staff can begin treatment when they arrive -- up to an hour sooner than if someone gets to the hospital by car.        The discharge information has been reviewed with the patient and spouse. The patient and spouse verbalized understanding. Discharge medications reviewed with the patient and spouse and appropriate educational materials and side effects teaching were provided. Patient armband removed and shreddedDischarge medications reviewed with the patient and spouse and appropriate educational materials and side effects teaching were provided.

## 2017-06-02 NOTE — CONSULTS
2 [facility]  Hospitalist Division    Consult Note    Patient: Alyse Amaral MRN: [id number]  CSN: [id number]    YOB: 1940  Age: 77 y.o. Sex: male    DOA: 6/2/2017 LOS:  LOS: 0 days        Requesting Physician:  Dr. Haydee Romero   Reason for Consultation:  Medical Management    Chief Complaint:  OA of right shoulder     Assessment/Plan     Patient Active Problem List   Diagnosis Code    Osteoarthritis of right shoulder region M19.011       A/P:  S/p Right total shoulder arthroplasty:  HTN: Hytrin, Lisinopril. Monitor BP control   CAD: ASA  Hypercholesterolemia: Lipitor   Hypothyroidism: Synthroid   Prediabetes: diet managed   DVT prophylaxis: SCDs       HPI:     Alyse Amaral is a 77 y.o. male with a hx of HTN, CAD, prediabetes, hypercholesterolemia, diverticulitis, tongue cancer and osteoarthritis who underwent right total shoulder arthroplasty by Dr. Haydee Romero. Patient reports he was a previous smoker of 1 PPD for 20 years who quit in 1983. He denies history of MI or CVA. Hospitalist Team was consulted for ongoing medical management. 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 No pertinent family history.     Social History     Social History    Marital status:      Spouse name: N/A    Number of children: N/A    Years of education: N/A     Social History Main Topics    Smoking status: Former Smoker     Quit date: 5/17/1983    Smokeless tobacco: None    Alcohol use Yes      Comment: rarely    Drug use: No    Sexual activity: Not Asked     Other Topics Concern    None     Social History Narrative       Prior to Admission medications    Medication Sig Start Date End Date Taking? Authorizing Provider   oxyCODONE-acetaminophen (PERCOCET) 5-325 mg per tablet Take 1-2 Tabs by mouth every four (4) hours as needed for Pain. Max Daily Amount: 12 Tabs. 6/2/17  Yes Raisa West MD   ondansetron (ZOFRAN ODT) 4 mg disintegrating tablet Take 1 Tab by mouth every eight (8) hours as needed for Nausea. 6/2/17  Yes Raisa West MD   aspirin 81 mg chewable tablet Take 81 mg by mouth daily. Yes Historical Provider   levothyroxine (SYNTHROID) 75 mcg tablet Take  by mouth Daily (before breakfast). Yes Historical Provider   lisinopril (PRINIVIL, ZESTRIL) 5 mg tablet Take 5 mg by mouth daily. Yes Historical Provider   terazosin (HYTRIN) 2 mg capsule Take 2 mg by mouth nightly. Yes Historical Provider   Cholecalciferol, Vitamin D3, 50,000 unit cap Take 1 Cap by mouth every seven (7) days. Yes Historical Provider   atorvastatin (LIPITOR) 40 mg tablet Take 40 mg by mouth daily. Historical Provider   ibuprofen (ADVIL) 200 mg tablet Take 400 mg by mouth.     Historical Provider       Allergies   Allergen Reactions    Codeine Nausea and Vomiting       Review of Systems  - fever, - chills, - fatigue, - weight loss, - night sweats   - sore throat, - sinus congestion, - lymphadenopathy, - vision changes  - CP, -  palpitations  - dyspnea on exertion, - dyspnea at rest, - cough, - hemoptysis  - nausea, - vomiting, - diarrhea, - abdominal pain, - reflux, - dysphagia  - dysuria, - hematuria, - urinary frequency  - rash, - pruritis  - back pain, - neck pain, - myalgia, + arthralgia  - H/A, - numbness, - tingling, + weakness, - slurred speech    Physical Exam:      Visit Vitals    /58 (BP 1 Location: Left arm, BP Patient Position: At rest)    Pulse (!) 49    Temp 97.4 °F (36.3 °C)    Resp 13    Ht 5' 7\" (1.702 m)    Wt 94.6 kg (208 lb 9 oz)    SpO2 98%    BMI 32.67 kg/m2       Physical Exam:  Gen: In general, this is a well nourished  male in no acute distress on room air.  HEENT: Sclerae nonicteric. Oral mucous membranes moist.    Neck: Supple with midline trachea. CV: RRR without murmur or rub appreciated. Resp:Respirations are unlabored without use of accessory muscles. Lung fields bilaterally without wheezes or rhonchi. Abd: Soft, nontender, nondistended. Normoactive bowel sounds. Extrem: Extremities are warm, without cyanosis or clubbing. No pitting pretibial edema. Palpable distal pulses X 4.   Skin: Warm, no visible rashes. Neuro: Patient is alert, oriented, and cooperative. No obvious focal defects. Moves all 4 extremities.     Labs Reviewed:    Recent Results (from the past 24 hour(s))   GLUCOSE, POC    Collection Time: 06/02/17  7:32 AM   Result Value Ref Range    Glucose (POC) 124 (H) 70 - 110 mg/dL   HGB & HCT    Collection Time: 06/02/17 11:58 AM   Result Value Ref Range    HGB 11.2 (L) 13.0 - 16.0 g/dL    HCT 34.0 (L) 36.0 - 48.0 %               GARRET Robles-SASHA [address]  Pager:  [phone]  Office:  [phone]

## 2017-06-02 NOTE — IP AVS SNAPSHOT
[address] Patient: Kiran Bynum. MRN: [id number] JOV:[birth year] You are allergic to the following Allergen Reactions Codeine Nausea and Vomiting Recent Documentation Height Weight BMI Smoking Status 1.702 m 94.6 kg 32.67 kg/m2 Former Smoker Emergency Contacts Name Discharge Info Relation Home Work Mobile Melinda Ely DISCHARGE CAREGIVER [3] Spouse [3] [phone] About your hospitalization You were admitted on:  June 2, 2017 You last received care in the:  [address] [place] [address],[address] You were discharged on:  Ayla 3, 2017 Unit phone number:  [phone] Why you were hospitalized Your primary diagnosis was:  Status Post Total Replacement Of Right Shoulder Your diagnoses also included:  Osteoarthritis Of Right Shoulder Region, Hypercholesteremia, Thyroid Disease, Diabetes Mellitus Type 2, Diet-Controlled (Hcc), Hypertension, Cad (Coronary Artery Disease) Providers Seen During Your Hospitalizations Provider Role Specialty Primary office phone Laroy Lundborg, MD Attending Provider Orthopedic Surgery [phone] Your Primary Care Physician (PCP) Primary Care Physician Office Phone Office Fax [place]  [phone] Follow-up Information Follow up With Details Comments Contact Info Oracio Alejandro MD   [address] [facility] [address] [facility] [id number] [phone] Laroy Lundborg, MD In 2 weeks call to schedulefollow up appointment  [facility] [address] [facility] [address] [phone] Current Discharge Medication List  
  
START taking these medications Dose & Instructions Dispensing Information Comments Morning Noon Evening Bedtime  
 ondansetron 4 mg disintegrating tablet Commonly known as:  ZOFRAN ODT  
   
 Your last dose was: Your next dose is:    
   
   
 Dose:  4 mg Take 1 Tab by mouth every eight (8) hours as needed for Nausea. Quantity:  20 Tab Refills:  0  
     
   
   
   
  
 oxyCODONE-acetaminophen 5-325 mg per tablet Commonly known as:  PERCOCET Your last dose was: Your next dose is:    
   
   
 Dose:  1-2 Tab Take 1-2 Tabs by mouth every four (4) hours as needed for Pain. Max Daily Amount: 12 Tabs. Quantity:  50 Tab Refills:  0 CONTINUE these medications which have NOT CHANGED Dose & Instructions Dispensing Information Comments Morning Noon Evening Bedtime ADVIL 200 mg tablet Generic drug:  ibuprofen Your last dose was: Your next dose is:    
   
   
 Dose:  400 mg Take 400 mg by mouth. Refills:  0  
     
   
   
   
  
 aspirin 81 mg chewable tablet Your last dose was: Your next dose is:    
   
   
 Dose:  81 mg Take 81 mg by mouth daily. Refills:  0  
     
   
   
   
  
 atorvastatin 40 mg tablet Commonly known as:  LIPITOR Your last dose was: Your next dose is:    
   
   
 Dose:  40 mg Take 40 mg by mouth daily. Refills:  0 Cholecalciferol (Vitamin D3) 50,000 unit Cap Your last dose was: Your next dose is:    
   
   
 Dose:  1 Cap Take 1 Cap by mouth every seven (7) days. Refills:  0  
     
   
   
   
  
 levothyroxine 75 mcg tablet Commonly known as:  SYNTHROID Your last dose was: Your next dose is: Take  by mouth Daily (before breakfast). Refills:  0  
     
   
   
   
  
 lisinopril 5 mg tablet Commonly known as:  Alecia Manuel Your last dose was: Your next dose is:    
   
   
 Dose:  5 mg Take 5 mg by mouth daily. Refills:  0  
     
   
   
   
  
 terazosin 2 mg capsule Commonly known as:  HYTRIN Your last dose was: Your next dose is:    
   
   
 Dose:  2 mg Take 2 mg by mouth nightly. Refills:  0 Where to Get Your Medications Information on where to get these meds will be given to you by the nurse or doctor. ! Ask your nurse or doctor about these medications  
  ondansetron 4 mg disintegrating tablet  
 oxyCODONE-acetaminophen 5-325 mg per tablet Discharge Instructions [place] DR. Darrick Duane SHOULDER ARTHROPLASTY POSTOPERATIVE INSTRUCTIONS 1. Apply bag of ice to your shoulder (not directly on the skin) at least 20 minutes each 4 hour interval for the first 2-3 days or until the swelling resolves. Sitting/sleeping in a 30-45 degree reclined position (recliner chair or propped on pillows) often helps with swelling and comfort. While most of the swelling resolves within the first week, it is not uncommon to experince swelling for up to 6 weeks after your surgery. 2. A light dressing may me needed on your wound if there is drainage. If there is no drainage then leave your wound open to air. 3.  You may shower after 48 hiours. Do not let water run constantly over the incisions or submerse them under water in the bath,pool,or hot tub as this may lead to infection. 4.  Wear the sling most of the time. 5.  It is important to begin working on range of motion immediately after surgery. Please follow Dr. Renan Nice instructions on the exercises  to do. 6.  The first post-operative office visit is apprximately 10 days after your surgery. Call [phone] to schedule this visit after you get home. 7.  At the [address] [place] [address] office visit, the following will be addressed: A.  Suture removal and determine the need for physical therapy B.  Review arthroscopy photos C.  Work release/status and forms 8.  Disability/FMLA forms are to be directed to Dr. Makenna Ac. Due to the consuming nature of this paperwork, a fee is charged to have them completed. 9.  Any other questions, concerns, or needs are handled by contacting  Dr. Arabella Ceballos  [phone] during office hours. If an emergency arises outside of office hours, please contact the office's answering service or your nearest Emergency Room. 1.    Pendulum, Circular 3 times daily in each direction. 2. Passive elevation Use only your good arm to raise the operative arm Sets of ten 3 times daily DISCHARGE SUMMARY from Nurse The following personal items are in your possession at time of discharge: 
 
Dental Appliances: None Visual Aid: None Home Medications: None Jewelry: None Clothing: Undergarments, Shirt, Footwear, Jacket/Coat, Pants Other Valuables: None PATIENT INSTRUCTIONS: 
 
After general anesthesia or intravenous sedation, for 24 hours or while taking prescription Narcotics: · Limit your activities · Do not drive and operate hazardous machinery · Do not make important personal or business decisions · Do  not drink alcoholic beverages · If you have not urinated within 8 hours after discharge, please contact your surgeon on call. Report the following to your surgeon: 
· Excessive pain, swelling, redness or odor of or around the surgical area · Temperature over 100.5 · Nausea and vomiting lasting longer than 4 hours or if unable to take medications · Any signs of decreased circulation or nerve impairment to extremity: change in color, persistent  numbness, tingling, coldness or increase pain · Any questions What to do at Home: 
Recommended activity: Activity as tolerated and No driving while on analgesics.  
 
If you experience any of the following symptoms redness, Nausea and vomiting lasting 4 hours or more, pain unrelieved by pain medication, numbness and tingling, loss of sensation, please follow up with MD Evelin Marks. *  Please give a list of your current medications to your Primary Care Provider. *  Please update this list whenever your medications are discontinued, doses are 
    changed, or new medications (including over-the-counter products) are added. *  Please carry medication information at all times in case of emergency situations. These are general instructions for a healthy lifestyle: No smoking/ No tobacco products/ Avoid exposure to second hand smoke Surgeon General's Warning:  Quitting smoking now greatly reduces serious risk to your health. Obesity, smoking, and sedentary lifestyle greatly increases your risk for illness A healthy diet, regular physical exercise & weight monitoring are important for maintaining a healthy lifestyle You may be retaining fluid if you have a history of heart failure or if you experience any of the following symptoms:  Weight gain of 3 pounds or more overnight or 5 pounds in a week, increased swelling in our hands or feet or shortness of breath while lying flat in bed. Please call your doctor as soon as you notice any of these symptoms; do not wait until your next office visit. Recognize signs and symptoms of STROKE: 
 
F-face looks uneven A-arms unable to move or move unevenly S-speech slurred or non-existent T-time-call 911 as soon as signs and symptoms begin-DO NOT go Back to bed or wait to see if you get better-TIME IS BRAIN. Warning Signs of HEART ATTACK Call 911 if you have these symptoms: 
? Chest discomfort. Most heart attacks involve discomfort in the center of the chest that lasts more than a few minutes, or that goes away and comes back. It can feel like uncomfortable pressure, squeezing, fullness, or pain. ? Discomfort in other areas of the upper body.  Symptoms can include pain or discomfort in one or both arms, the back, neck, jaw, or stomach. ? Shortness of breath with or without chest discomfort. ? Other signs may include breaking out in a cold sweat, nausea, or lightheadedness. Don't wait more than five minutes to call [address]! Fast action can save your life. Calling 911 is almost always the fastest way to get lifesaving treatment. Emergency Medical Services staff can begin treatment when they arrive  up to an hour sooner than if someone gets to the hospital by car. The discharge information has been reviewed with the patient and spouse. The patient and spouse verbalized understanding. Discharge medications reviewed with the patient and spouse and appropriate educational materials and side effects teaching were provided. Patient armband removed and shreddedDischarge medications reviewed with the patient and spouse and appropriate educational materials and side effects teaching were provided. Discharge Orders None [employer] [facility] Announcement We are excited to announce that we are making your provider's discharge notes available to you in [employer] [facility]. You will see these notes when they are completed and signed by the physician that discharged you from your recent hospital stay. If you have any questions or concerns about any information you see in [employer] [facility], please call the Health Information Department where you were seen or reach out to your Primary Care Provider for more information about your plan of care. Introducing [facility]! Dalila Jung introduces [employer] patient portal. Now you can access parts of your medical record, email your doctor's office, and request medication refills online. 1. In your internet browser, go to [email or web address] 2. Click on the First Time User? Click Here link in the Sign In box. You will see the New Member Sign Up page. 3. Enter your [employer] Access Code exactly as it appears below. You will not need to use this code after youve completed the sign-up process. If you do not sign up before the expiration date, you must request a new code. · [employer] Access Code: [email or web address] Expires: 2017 12:31 PM 
 
4. Enter the last four digits of your Social Security Number (xxxx) and Date of Birth (mm/dd/yyyy) as indicated and click Submit. You will be taken to the next sign-up page. 5. Create a [employer] ID. This will be your [employer] login ID and cannot be changed, so think of one that is secure and easy to remember. 6. Create a [employer] password. You can change your password at any time. 7. Enter your Password Reset Question and Answer. This can be used at a later time if you forget your password. 8. Enter your e-mail address. You will receive e-mail notification when new information is available in [address] 9. Click Sign Up. You can now view and download portions of your medical record. 10. Click the Download Summary menu link to download a portable copy of your medical information. If you have questions, please visit the Frequently Asked Questions section of the [employer] website. Remember, [employer] is NOT to be used for urgent needs. For medical emergencies, dial 911. Now available from your iPhone and Android! General Information Please provide this summary of care documentation to your next provider. Patient Signature:  ____________________________________________________________ Date:  ____________________________________________________________  
  
Christiano Williamugh Provider Signature:  ____________________________________________________________ Date:  ____________________________________________________________

## 2017-06-02 NOTE — ROUTINE PROCESS
TRANSFER - IN REPORT:    Verbal report received from SHAZIA Miguel RN(name) on [place] Comment.  being received from [facility]) for routine progression of care      Report consisted of patients Situation, Background, Assessment and   Recommendations(SBAR). Information from the following report(s) SBAR, Kardex, Procedure Summary and MAR was reviewed with the receiving nurse. Opportunity for questions and clarification was provided. Assessment completed upon patients arrival to unit and care assumed. 1130- Pt arrives from PACU.    1146- Pt given IV pain med. 1247- Pt verbalizes minor relief from pain. Pt given PO pain med.    1359- Pt verbalizes some relief from pain at this time. 1631- Pt given PO pain med. 1733- Pt verbalizes some relief from pain at this time. 1912- Bedside and Verbal shift change report given to Loulou Amor RN (oncoming nurse) by HARMONY Kasper (offgoing nurse). Report included the following information SBAR, Kardex and MAR.

## 2017-06-02 NOTE — ANESTHESIA PREPROCEDURE EVALUATION
Anesthetic History   No history of anesthetic complications            Review of Systems / Medical History  Patient summary reviewed and pertinent labs reviewed    Pulmonary  Within defined limits                 Neuro/Psych   Within defined limits           Cardiovascular                  Exercise tolerance: >4 METS     GI/Hepatic/Renal  Within defined limits              Endo/Other    Diabetes: well controlled, type 2  Hypothyroidism: well controlled  Arthritis and cancer     Other Findings   Comments:   Risk Factors for Postoperative nausea/vomiting:       History of postoperative nausea/vomiting? NO       Female? NO       Motion sickness? NO       Intended opioid administration for postoperative analgesia? YES      Smoking Abstinence  Current Smoker? NO  Elective Surgery? YES  Seen preoperatively by anesthesiologist or proxy prior to day of surgery? YES  Pt abstained from smoking 24 hours prior to anesthesia?  N/A           Physical Exam    Airway  Mallampati: III  TM Distance: 4 - 6 cm  Neck ROM: decreased range of motion   Mouth opening: Normal     Cardiovascular    Rhythm: regular  Rate: normal         Dental    Dentition: Caps/crowns     Pulmonary  Breath sounds clear to auscultation               Abdominal  GI exam deferred       Other Findings            Anesthetic Plan    ASA: 3  Anesthesia type: general          Induction: Intravenous  Anesthetic plan and risks discussed with: Patient

## 2017-06-02 NOTE — PROGRESS NOTES
Pt resting in bed with eyes closed. Visitor at bedside. Discussed pain control and plan for PT. Verbalized understanding.

## 2017-06-03 VITALS
SYSTOLIC BLOOD PRESSURE: 119 MMHG | WEIGHT: 208.56 LBS | HEART RATE: 60 BPM | RESPIRATION RATE: 20 BRPM | HEIGHT: 67 IN | OXYGEN SATURATION: 92 % | TEMPERATURE: 98 F | DIASTOLIC BLOOD PRESSURE: 62 MMHG | BODY MASS INDEX: 32.73 KG/M2

## 2017-06-03 PROBLEM — E11.9 DIABETES MELLITUS TYPE 2, DIET-CONTROLLED (HCC): Status: ACTIVE | Noted: 2017-06-03

## 2017-06-03 PROBLEM — I10 HYPERTENSION: Status: ACTIVE | Noted: 2017-06-03

## 2017-06-03 PROBLEM — E07.9 THYROID DISEASE: Status: ACTIVE | Noted: 2017-06-03

## 2017-06-03 PROBLEM — I25.10 CAD (CORONARY ARTERY DISEASE): Status: ACTIVE | Noted: 2017-06-03

## 2017-06-03 PROBLEM — E78.00 HYPERCHOLESTEREMIA: Status: ACTIVE | Noted: 2017-06-03

## 2017-06-03 PROBLEM — Z96.611 STATUS POST TOTAL REPLACEMENT OF RIGHT SHOULDER: Status: ACTIVE | Noted: 2017-06-03

## 2017-06-03 PROCEDURE — 74011250637 HC RX REV CODE- 250/637: Performed by: ORTHOPAEDIC SURGERY

## 2017-06-03 PROCEDURE — 74011250636 HC RX REV CODE- 250/636: Performed by: ORTHOPAEDIC SURGERY

## 2017-06-03 PROCEDURE — 74011250637 HC RX REV CODE- 250/637: Performed by: NURSE PRACTITIONER

## 2017-06-03 PROCEDURE — 97110 THERAPEUTIC EXERCISES: CPT

## 2017-06-03 PROCEDURE — 77030027138 HC INCENT SPIROMETER -A

## 2017-06-03 RX ADMIN — OXYCODONE HYDROCHLORIDE AND ACETAMINOPHEN 1 TABLET: 5; 325 TABLET ORAL at 10:41

## 2017-06-03 RX ADMIN — LISINOPRIL 5 MG: 5 TABLET ORAL at 10:40

## 2017-06-03 RX ADMIN — CEFAZOLIN SODIUM 2 G: 2 SOLUTION INTRAVENOUS at 07:00

## 2017-06-03 RX ADMIN — Medication 10 ML: at 07:01

## 2017-06-03 RX ADMIN — LEVOTHYROXINE SODIUM 75 MCG: 75 TABLET ORAL at 07:49

## 2017-06-03 RX ADMIN — DOCUSATE SODIUM 100 MG: 100 CAPSULE, LIQUID FILLED ORAL at 10:40

## 2017-06-03 RX ADMIN — OXYCODONE HYDROCHLORIDE AND ACETAMINOPHEN 1 TABLET: 5; 325 TABLET ORAL at 03:47

## 2017-06-03 RX ADMIN — ATORVASTATIN CALCIUM 40 MG: 40 TABLET, FILM COATED ORAL at 10:40

## 2017-06-03 RX ADMIN — ASPIRIN 325 MG: 325 TABLET, DELAYED RELEASE ORAL at 10:40

## 2017-06-03 NOTE — DISCHARGE SUMMARY
Discharge Summary    Admit Date: 2017  Discharge Date:  6/3/2017     Patient ID:   Name:  Claudeen Lather. Age:  77 y.o.    :  1940    Admitting Diagnosis: right shoulder oa m19.011  Osteoarthritis of right shoulder region     Post Operative Day: 2    Operative Procedures:  RECONSTR TOTAL SHOULDER IMPLANT [42038] (SHOULDER ARTHROPLASTY/TOTAL)      Isolation Precautions:   Not required. Patient is not currently contagious. Physical Exam on Discharge:  Visit Vitals    /62 (BP 1 Location: Left arm)    Pulse 60    Temp 98 °F (36.7 °C)    Resp 20    Ht 5' 7\" (1.702 m)    Wt 94.6 kg (208 lb 9 oz)    SpO2 92%    BMI 32.67 kg/m2         General: in no apparent distress   Wound: clean, dry   Extremities:  Neurovascular intact    Dressing:  Dry   DVT Exam:   No evidence of DVT seen on physical exam;  compartments soft and NT. Relevant labs within last 72 hours:    CBC w/Diff    Lab Results   Component Value Date/Time    WBC 5.1 2017 09:15 AM    RBC 3.89 (L) 2017 09:15 AM    HCT 34.0 (L) 2017 11:58 AM    MCV 96.9 2017 09:15 AM    MCH 32.1 2017 09:15 AM    MCHC 33.2 2017 09:15 AM    RDW 13.4 2017 09:15 AM    Lab Results   Component Value Date/Time    RDW 13.4 2017 09:15 AM          BMP   Lab Results   Component Value Date     2017    CO2 25 2017    BUN 26 (H) 2017          Coagulation   No results found for: INR, APTT           Condition at discharge: Afebrile  Ambulating  Eating, Drinking, Voiding  Stable    Current Discharge Medication List      START taking these medications    Details   oxyCODONE-acetaminophen (PERCOCET) 5-325 mg per tablet Take 1-2 Tabs by mouth every four (4) hours as needed for Pain. Max Daily Amount: 12 Tabs. Qty: 50 Tab, Refills: 0      ondansetron (ZOFRAN ODT) 4 mg disintegrating tablet Take 1 Tab by mouth every eight (8) hours as needed for Nausea.   Qty: 20 Tab, Refills: 0 CONTINUE these medications which have NOT CHANGED    Details   aspirin 81 mg chewable tablet Take 81 mg by mouth daily. levothyroxine (SYNTHROID) 75 mcg tablet Take  by mouth Daily (before breakfast). lisinopril (PRINIVIL, ZESTRIL) 5 mg tablet Take 5 mg by mouth daily. terazosin (HYTRIN) 2 mg capsule Take 2 mg by mouth nightly. Cholecalciferol, Vitamin D3, 50,000 unit cap Take 1 Cap by mouth every seven (7) days. atorvastatin (LIPITOR) 40 mg tablet Take 40 mg by mouth daily. ibuprofen (ADVIL) 200 mg tablet Take 400 mg by mouth. PCP:  Josué Uriarte MD        Disposition:  Clear for discharge to home, if clear by medicine service. Follow-up in the office in 10 days with Dr. Ayaz Heredia; call [phone] to schedule appointment.      Wound Care: open to air       Ivana López PA-C (R)  6/3/2017  Pager [phone]  Office [phone]  Cell [phone]

## 2017-06-03 NOTE — PROGRESS NOTES
[facility]  Hospitalist Division        Inpatient Daily Progress Note    Daily progress Note    Patient: Liv Schwarz MRN: [id number]  CSN: [id number]    YOB: 1940  Age: 77 y.o. Sex: male    DOA: 6/2/2017 LOS:  LOS: 1 day                    Chief Complaint:  Right total shoulder arthroplasty       Subjective:      Working with PT. No events overnight. In NAD. Objective:      Visit Vitals    /62 (BP 1 Location: Left arm)    Pulse 60    Temp 98 °F (36.7 °C)    Resp 20    Ht 5' 7\" (1.702 m)    Wt 94.6 kg (208 lb 9 oz)    SpO2 92%    BMI 32.67 kg/m2           Physical Exam:  General appearance: alert, cooperative, no distress, appears stated age  Lungs: clear to auscultation bilaterally, no wheezes   Heart: regular rate and rhythm, S1, S2 normal, no murmur, click, rub or gallop  Abdomen: soft, non tender, non distended.  Normoactive bowel sounds  Extremities: Right shoulder in sling   Skin: surgical incision CDI   Neurologic: moves all 4 extremities   PSY: Mood and affect normal, appropriately behaved        Intake and Output:  Current Shift:  06/03 0701 - 06/03 1900  In: 480 [P.O.:480]  Out: -   Last three shifts:  06/01 1901 - 06/03 0700  In: 1930 [P.O.:480; I.V.:1450]  Out: 600 [Urine:600]    Recent Results (from the past 24 hour(s))   HGB & HCT    Collection Time: 06/02/17 11:58 AM   Result Value Ref Range    HGB 11.2 (L) 13.0 - 16.0 g/dL    HCT 34.0 (L) 36.0 - 48.0 %           Lab Results   Component Value Date/Time    Glucose 106 05/17/2017 09:15 AM        Assessment/Plan:     Patient Active Problem List   Diagnosis Code    Osteoarthritis of right shoulder region M19.011    Hypercholesteremia E78.00    Thyroid disease E07.9    Diabetes mellitus type 2, diet-controlled (HCC) E11.9    Hypertension I10    CAD (coronary artery disease) I25.10    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 Hytrin, Lisinopril.  Monitor BP control   CAD: ASA  Hypercholesterolemia: Lipitor   Hypothyroidism: Synthroid   Prediabetes: diet managed   DVT prophylaxis: SCDs   Medically stable for discharge home per Primary Team       MARIANA Joe [address]  Pager:  [phone]  Office:  [phone]

## 2017-06-03 NOTE — PROGRESS NOTES
1043 am meds given, patient made aware of discharge orders, requesting to wait until lunch tray arrives before leaving, no acute distress noted, assessment complete     1427 discharge instruction given, patient verbalized understanding, teach back utilized, scripts given as well as teaching. Patient made aware of next dose time for percocet. Patient request wife sign discharge instruction electronically. Teaching given in regards to range of motion exercises.  Wife present for teaching per patient's request

## 2017-06-03 NOTE — PROGRESS NOTES
Problem: Mobility Impaired (Adult and Pediatric)  Goal: *Acute Goals and Plan of Care (Insert Text)  PHYSICAL THERAPY SHORT TERM GOALS : no weight bearing with right shoulder /UE  1. Patient will perform/completebed mobility with modified independence within 1 week(s). 2. Patient will perform/completesupine to sitting with modified independence within 1 week(s). 3. Patient will perform/completesit to stand with modified independence within 1 week(s). 4. Patient will perform/completeambulate for 150 feet with least restrictive device with modified independence within 1 week(s). 5. Patient will perform /completeascend and descend 2 steps without railing modified independence within 1 week(s). 6. Patient will complete pendulum exercises for right shoulder independently 3 times per day. Therapist Linus Fitzgerald, PT 6/2/2017  Time Calculation: 20 mins   Outcome: Progressing Towards Goal  PHYSICAL THERAPY TREATMENT     Patient: Karyn Wells. (70 y.o. male)  Date: 6/3/2017  Diagnosis: right shoulder oa m19.011  Osteoarthritis of right shoulder region Status post total replacement of right shoulder  Procedure(s) (LRB):  right release total SHOULDER  ARTHROPLASTY (Right) 1 Day Post-Op  Precautions: Fall (no wieght on and no arom of right shoulder )   Chart, physical therapy assessment, plan of care and goals were reviewed. ASSESSMENT:  Pt sitting in chair upon entering room, reports ambulating up and down the halls with spouse. No difficulty with sit to stand from chair. Removed sling, reviewed Codman's exercise in all directions, pt demo proper technique. In chair reviewed finger, wrist, forearm and elbow exercises. Educated pt and spouse on proper fit of sling. Pt left sitting in chair. Education: Sling use and fit, dressing and washing tech, TE 2x/day, codman's at kitchen counter to protect back, no active movement of shoulder jt use other UE to gently move it.   Progression toward goals:  [X] Improving appropriately and progressing toward goals  [ ]      Improving slowly and progressing toward goals  [ ]      Not making progress toward goals and plan of care will be adjusted       PLAN:  Patient continues to benefit from skilled intervention to address the above impairments. Continue treatment per established plan of care. Discharge Recommendations:  Home Health and Outpatient  Further Equipment Recommendations for Discharge:  N/A       SUBJECTIVE:   Patient stated I have been walking all over the place.       OBJECTIVE DATA SUMMARY:   Critical Behavior:  Neurologic State: Alert  Orientation Level: Oriented X4  Cognition: Follows commands  Safety/Judgement: Awareness of environment, Fall prevention  Functional Mobility Training:  Transfers:  Sit to Stand: Independent  Stand to Sit: Independent  Balance:  Sitting: Intact  Sitting - Static: Good (unsupported)  Sitting - Dynamic: Good (unsupported)  Standing: Intact; Without support  Standing - Static: Good  Standing - Dynamic : Good  Therapeutic Exercises:   Codman's, finger flex/ext, wrist flex/ext/ulnar/rad deviation, forearm pro/sup, elbow flex/ext  Pain:  Pre 5  Post 5  Pain Scale 1: Numeric (0 - 10)     Pain Location 1: Shoulder  Pain Orientation 1: Right  Pain Description 1: Aching  Pain Intervention(s) 1: Medication (see MAR)  Activity Tolerance:   Good  Please refer to the flowsheet for vital signs taken during this treatment.   After treatment:   [X] Patient left in no apparent distress sitting up in chair  [ ] Patient left in no apparent distress in bed  [X] Call bell left within reach  [ ] Nursing notified  [X] Caregiver present  [ ] Bed alarm activated      Umair Clarke PTA   Time Calculation: 24 mins

## 2017-06-03 NOTE — PROGRESS NOTES
conducted an initial consultation and Spiritual Assessment for Karyn Templeton, who is a 77 y. o.,male. Patients Primary Language is: [place]. According to the patients EMR Latter day Affiliation is: [facility].     The reason the Patient came to the hospital is:   Patient Active Problem List    Diagnosis Date Noted    Hypercholesteremia 06/03/2017    Thyroid disease 06/03/2017    Diabetes mellitus type 2, diet-controlled ([facility] [address] 06/03/2017    Hypertension 06/03/2017    CAD (coronary artery disease) 06/03/2017    Status post total replacement of right shoulder 06/03/2017    Osteoarthritis of right shoulder region 06/02/2017        The  provided the following Interventions:  Initiated a relationship of care and support. Explored issues of jorge, belief, spirituality and Alevism/ritual needs while hospitalized. Listened empathically. Provided information about Spiritual Care Services. Offered prayer and assurance of continued prayers on patient's behalf. Chart reviewed. The following outcomes were achieved:  Patient shared limited information about both their medical narrative and spiritual journey/beliefs. Patient processed feeling about current hospitalization. Patient expressed gratitude for 's visit. Assessment:  Patient does not have any Alevism/cultural needs that will affect patients preferences in health care. There are no further spiritual or Alevism issues which require intervention at this time. Plan:  Chaplains will continue to follow and will provide pastoral care on an as needed/requested basis.  recommends bedside caregivers page  on duty if patient shows signs of acute spiritual or emotional distress. Foster Buchanan M.Div.   [facility] [address]  [phone]

## 2017-06-03 NOTE — PROGRESS NOTES
Progress Note      Post Operative Day: 1    Assessment:    1. Status post  RECONSTR TOTAL SHOULDER IMPLANT [16426] (SHOULDER ARTHROPLASTY/TOTAL) for right shoulder oa m19.011  Osteoarthritis of right shoulder region 6/2/2017,   Progressing. PLAN:    1. Mobilize. Continue P.T.  2.  Sling, teri demonstrated  3. Discharge Planning home today           HPI: Nile Montesinos is a 77 y.o. male patient without new complaints status post TSA for right shoulder oa m19.011  Osteoarthritis of right shoulder region 6/2/2017. No new orthopaedic changes. Blood pressure 119/62, pulse 60, temperature 98 °F (36.7 °C), resp. rate 20, height 5' 7\" (1.702 m), weight 94.6 kg (208 lb 9 oz), SpO2 92 %. CBC w/Diff   Lab Results   Component Value Date/Time    WBC 5.1 05/17/2017 09:15 AM    RBC 3.89 (L) 05/17/2017 09:15 AM    HCT 34.0 (L) 06/02/2017 11:58 AM          Physical Assessment:  General: in no apparent distress   Wound: clean, dry   Extremities:  Neurovascular intact    Dressing:  minimal SA drainage   DVT Exam:   No exam evidence to suggest DVT. Compartments soft and NT. Radiology: AP and transscapular projections of the right shoulder are obtained  with the patient in a sling. There is a reverse ball and socket right total  shoulder arthroplasty with components projecting in near-anatomic position. No  evidence of periprosthetic fracture. Included portion of the right lung  demonstrates no acute abnormality.  Ice pack noted over the mid humeral shaft         - Alexa Carballo PA-C  6/3/2017  Pager [phone]  Office [phone]  Cell [phone]

## 2017-06-04 NOTE — PROGRESS NOTES
[facility]   Discharge Planning/ Assessment    Reasons for Intervention: Pt jocelynn interviewed 06/03/17 a.m. This is late entry. He verified info, lives with wife, no HH or DME Plan is home.     High Risk Criteria  [] Yes  [x]No   Physician Referral  [] Yes  [x]No        Date    Nursing Referral  [] Yes  [x]No        Date    Patient/Family Request  [] Yes  [x]No        Date       Resources:    Medicare  [x] Yes  []No   Medicaid  [] Yes  [x]No   No Resources  [] Yes  [x]No   Private Insurance  [x] Yes  []No    Name/Phone Number    Other  [] Yes  [x]No        (i.e. Workman's Comp)         Prior Services:    Prior Services  [] Yes  [x]No   Home Health  [] Yes  [x]No   [address]  [] Yes  [x]No        [address] Program  [] Yes  [x]No       Meals on Wheels  [] Yes  [x]No   Office on Aging  [] Yes  [x]No   Transportation Services  [] Yes  [x]No   Nursing Home  [] Yes  [x]No        Nursing Home Name    [address]  [] Yes  [x]No        [address] Name    Other       Information Source:      Information obtained from  [x] Patient  [] Parent   [] [address]  [] Child  [] Spouse   [] Significant Other/Partner   [] Friend      [] EMS    [] Nursing Home Chart          [] Other:   Chart Review  [x] Yes  []No     Family/Support System:    Patient lives with  [] Alone    [x] Spouse   [] Significant Other  [] Children  [] Caretaker   [] Parent  [] Sibling     [] Other       Other Support System:    Is the patient responsible for care of others  [] Yes  [x]No   Information of person caring for patient on  discharge    Managers financial affairs independently  [x] Yes  []No   If no, explain:      Status Prior to Admission:    Mental Status  [x] Awake  [] Alert  [] Oriented  [] Quiet/Calm [] Lethargic/Sedated   [] Disoriented  [] Restless/Anxious  [] Combative   Personal Care  [] Dependent  [x] [address]  [] Requires Assistance   Meal Preparation Ability  [x] Independent   [] Standby Assistance   [] Minimal Assistance   [] Moderate Assistance  [] Maximum Assistance     [] Total Assistance   Chores  [x] Independent with Chores   [] N/A Nursing Home Resident   [] Requires Assistance   Bowel/Bladder  [x] Continent  [] Catheter  [] Incontinent  [] Ostomy Self-Care    [] Urine Diversion Self-Care  [] Maximum Assistance     [] Total Assistance   Number of Persons needed for assistance    DME at home  [] Vikram Rudd  [] Bernardo Rudd   [] Commode    [] Bathroom/Grab Bars  [] Hospital Bed  [] Nebulizer  [] Oxygen           [] Raised Toilet Seat  [] Shower Chair  [] Side Rails for Bed   [] Tub Transfer Bench   [] Edsel Heimlich  [] [address]      [] Other:   Vendor      Treatment Presently Receiving:    Current Treatments  [] Chemotherapy  [] Dialysis  [] Insulin  [] IVAB [x] IVF   [] O2  [] PCA   [] PT   [] RT   [] Tube Feedings   [] Wound Care     Psychosocial Evaluation:    Verbalized Knowledge of Disease Process  [] Patient  []Family   Coping with Disease Process  [] Patient  []Family   Requires Further Counseling Coping with Disease Process  [] Patient  []Family     Identified Projected Needs:    Home Health Aid  [] Yes  [x]No   Transportation  [] Yes  [x]No   Education  [] Yes  [x]No        Specific Education     Financial Counseling  [] Yes  [x]No   Inability to Care for Self/Will Require 24 hour care  [] Yes  [x]No   Pain Management  [] Yes  [x]No   Home Infusion Therapy  [] Yes  [x]No   Oxygen Therapy  [] Yes  [x]No   DME  [] Yes  [x]No   Long Term Care Placement  [] Yes  [x]No   Rehab  [] Yes  [x]No   Physical Therapy  [] Yes  [x]No   Needs Anticipated At This Time  [] Yes  [x]No     Intra-Hospital Referral:    [facility]  [] Yes  [x]No     [] Yes  [x]No   Patient Representative  [] Yes  [x]No   Staff for Teaching Needs  [] Yes  [x]No   Specialty Teaching Needs     Diabetic Educator  [] Yes  [x]No   Referral for Diabetic Educator Needed  [] Yes  [x]No  If Yes, place order for Nutritionist or Diabetic Consult     Tentative Discharge Plan:    Home with No Services  [x] Yes  []No   Home with [address]  [] Yes  [x]No        If Yes, specify type    Home Care Program  [] Yes  [x]No        If Yes, specify type    Meals on Wheels  [] Yes  [x]No   Office of Aging  [] Yes  [x]No   NHP  [] Yes  [x]No   Return to the Nursing Home  [] Yes  [x]No   Rehab Therapy  [] Yes  [x]No   Acute Rehab  [] Yes  [x]No   Subacute Rehab  [] Yes  [x]No   Private Care  [] Yes  [x]No   Substance Abuse Referral  [] Yes  [x]No   Transportation  [] Yes  [x]No   Chore Service  [] Yes  [x]No   Inpatient Hospice  [] Yes  [x]No   OP RT  [] Yes  [x] No   OP Hemo  [] Yes  [x] No   OP PT  [] Yes  [x]No   Support Group  [] Yes  [x]No   Reach to Recovery  [] Yes  [x]No   OP Oncology Clinic  [] Yes  [x]No   Clinic Appointment  [] Yes  [x]No   DME  [] Yes  [x]No   Comments    Name of D/C Planner or  Given to Patient or Family Rito Modi RN   Phone Number         Extension [phone]   Date For 06/03/17   Time    If you are discharged home, whom do you designate to participate in your discharge plan and receive any information needed?      Enter name of designee         Phone # of designee         Address of designee         Updated         Patient refused to designate any           individual

## 2017-06-04 NOTE — ACP (ADVANCE CARE PLANNING)
Patient has designated ____wife____________________ to participate in his/her discharge plan and to receive any needed information.      Name:   Address:  [place]

## 2022-03-18 PROBLEM — Z96.611 STATUS POST TOTAL REPLACEMENT OF RIGHT SHOULDER: Status: ACTIVE | Noted: 2017-06-03

## 2022-03-19 PROBLEM — E07.9 THYROID DISEASE: Status: ACTIVE | Noted: 2017-06-03

## 2022-03-19 PROBLEM — I10 HYPERTENSION: Status: ACTIVE | Noted: 2017-06-03

## 2022-03-19 PROBLEM — I25.10 CAD (CORONARY ARTERY DISEASE): Status: ACTIVE | Noted: 2017-06-03

## 2022-03-19 PROBLEM — E78.00 HYPERCHOLESTEREMIA: Status: ACTIVE | Noted: 2017-06-03

## 2022-03-19 PROBLEM — E11.9 DIABETES MELLITUS TYPE 2, DIET-CONTROLLED (HCC): Status: ACTIVE | Noted: 2017-06-03

## 2022-03-20 PROBLEM — M19.011 OSTEOARTHRITIS OF RIGHT SHOULDER REGION: Status: ACTIVE | Noted: 2017-06-02

## (undated) DEVICE — SPONGE GZ W4XL4IN COT 12 PLY TYP VII WVN C FLD DSGN

## (undated) DEVICE — Device

## (undated) DEVICE — HEX-LOCKING BLADE ELECTRODE: Brand: EDGE

## (undated) DEVICE — SUTURE VCRL SZ 0 L18IN ABSRB UD L36MM CT-1 1/2 CIR J840D

## (undated) DEVICE — DEPAUL TOTAL JOINT CDS: Brand: MEDLINE INDUSTRIES, INC.

## (undated) DEVICE — ELASTIC SHOULDER IMMOBILIZER: Brand: DEROYAL

## (undated) DEVICE — DRAPE,U/ SHT,SPLIT,PLAS,STERIL: Brand: MEDLINE

## (undated) DEVICE — BIT DRL KIT SHLDR 2MM/3.2MM -- DISP EQUINOXE

## (undated) DEVICE — SOL IRRIGATION INJ NACL 0.9% 500ML BTL

## (undated) DEVICE — KENDALL SCD EXPRESS SLEEVES, KNEE LENGTH, MEDIUM: Brand: KENDALL SCD

## (undated) DEVICE — SYR ASPIR ACDA INCL 60ML

## (undated) DEVICE — KIT APPL W/ SPRY TIP ACCELERATE

## (undated) DEVICE — SUTURE ABSORBABLE BRAIDED 2-0 CT-1 27 IN UD VICRYL J259H

## (undated) DEVICE — KIT CHAIR TRIMANO FOAM W/ SUPP ARM DRP ERGONOMICALLY DESIGNED

## (undated) DEVICE — TABLE COVER: Brand: CONVERTORS

## (undated) DEVICE — SHEET, DRAPE, SPLIT, STERILE: Brand: MEDLINE

## (undated) DEVICE — BLADE SAW W0.98XL3.54IN THK0.05IN CUT THK0.05IN SAG

## (undated) DEVICE — NEEDLE HYPO 22GA L1.5IN BLK S STL HUB POLYPR SHLD REG BVL

## (undated) DEVICE — STERILE LATEX POWDER-FREE SURGICAL GLOVESWITH NITRILE COATING: Brand: PROTEXIS

## (undated) DEVICE — SOLUTION IV STRL H2O 500 ML AQUALITE POUR BTL

## (undated) DEVICE — SUTURE MCRYL SZ 4-0 L18IN ABSRB UD L19MM PS-2 3/8 CIR PRIM Y496G

## (undated) DEVICE — (D)GLOVE SURG 8 PWD LTX -- DISC BY MFR USE ITEM 110052

## (undated) DEVICE — 3M™ STERI-DRAPE™ INCISE DRAPE, XL 1051: Brand: STERI-DRAPE™

## (undated) DEVICE — REM POLYHESIVE ADULT PATIENT RETURN ELECTRODE: Brand: VALLEYLAB

## (undated) DEVICE — 3M™ IOBAN™ 2 ANTIMICROBIAL INCISE DRAPE 6651EZ: Brand: IOBAN™ 2

## (undated) DEVICE — INTENDED FOR TISSUE SEPARATION, AND OTHER PROCEDURES THAT REQUIRE A SHARP SURGICAL BLADE TO PUNCTURE OR CUT.: Brand: BARD-PARKER SAFETY BLADES SIZE 15, STERILE

## (undated) DEVICE — SHOULDER IMMOBILIZER: Brand: DEROYAL

## (undated) DEVICE — I DISPOSABLE MIXING BOWL AND SPATULA: Brand: HOWMEDICA, MIX-KIT

## (undated) DEVICE — CONTROL SYRINGE LUER-LOCK TIP: Brand: MONOJECT

## (undated) DEVICE — 3M™ TEGADERM™ TRANSPARENT FILM DRESSING FRAME STYLE, 1626W, 4 IN X 4-3/4 IN (10 CM X 12 CM), 50/CT 4CT/CASE: Brand: 3M™ TEGADERM™

## (undated) DEVICE — SYSTEM SKIN CLSR 22CM DERMBND PRINEO

## (undated) DEVICE — (D)SYR 30ML LR LCK 1ML GRAD -- DISC BY MFR  USE ITEM 304151

## (undated) DEVICE — COVER LT HNDL BLU PLAS

## (undated) DEVICE — STERILE LATEX POWDER-FREE SURGICAL GLOVES: Brand: PROTEXIS

## (undated) DEVICE — INTENDED FOR TISSUE SEPARATION, AND OTHER PROCEDURES THAT REQUIRE A SHARP SURGICAL BLADE TO PUNCTURE OR CUT.: Brand: BARD-PARKER SAFETY BLADES SIZE 10, STERILE

## (undated) DEVICE — PREP SKN CHLRAPRP 26ML TNT -- CONVERT TO ITEM 373320